# Patient Record
Sex: MALE | Race: WHITE | Employment: OTHER | URBAN - METROPOLITAN AREA
[De-identification: names, ages, dates, MRNs, and addresses within clinical notes are randomized per-mention and may not be internally consistent; named-entity substitution may affect disease eponyms.]

---

## 2017-04-10 RX ORDER — LORATADINE 10 MG/1
10 TABLET ORAL DAILY
COMMUNITY
End: 2019-03-07

## 2017-04-10 RX ORDER — DULOXETIN HYDROCHLORIDE 60 MG/1
60 CAPSULE, DELAYED RELEASE ORAL DAILY
COMMUNITY
End: 2019-03-07

## 2017-04-10 RX ORDER — METFORMIN HYDROCHLORIDE 500 MG/1
1000 TABLET ORAL 2 TIMES DAILY WITH MEALS
COMMUNITY
End: 2019-03-07

## 2017-04-10 RX ORDER — GABAPENTIN 600 MG/1
800 TABLET ORAL 3 TIMES DAILY
COMMUNITY
End: 2022-10-17

## 2017-04-10 RX ORDER — ATORVASTATIN CALCIUM 20 MG/1
20 TABLET, FILM COATED ORAL
COMMUNITY

## 2017-04-10 RX ORDER — FENOFIBRATE 200 MG/1
200 CAPSULE ORAL DAILY
COMMUNITY
End: 2019-03-07

## 2017-04-10 RX ORDER — TESTOSTERONE 10 MG/.5G
4 GEL, METERED TOPICAL DAILY
COMMUNITY
End: 2019-03-07

## 2017-04-10 RX ORDER — ESZOPICLONE 2 MG/1
2 TABLET, FILM COATED ORAL
COMMUNITY
End: 2019-03-07

## 2017-04-10 RX ORDER — TELMISARTAN AND HYDROCHLORTHIAZIDE 80; 12.5 MG/1; MG/1
1 TABLET ORAL DAILY
COMMUNITY
End: 2021-02-12

## 2017-04-10 RX ORDER — VERAPAMIL HYDROCHLORIDE 180 MG/1
180 CAPSULE, EXTENDED RELEASE ORAL DAILY
COMMUNITY
End: 2020-10-13

## 2017-04-10 RX ORDER — CLOPIDOGREL BISULFATE 75 MG/1
75 TABLET ORAL DAILY
COMMUNITY
End: 2017-04-19

## 2017-04-14 ENCOUNTER — ANESTHESIA EVENT (OUTPATIENT)
Dept: SURGERY | Age: 77
DRG: 517 | End: 2017-04-14
Payer: MEDICARE

## 2017-04-17 ENCOUNTER — ANESTHESIA (OUTPATIENT)
Dept: SURGERY | Age: 77
DRG: 517 | End: 2017-04-17
Payer: MEDICARE

## 2017-04-17 ENCOUNTER — HOSPITAL ENCOUNTER (INPATIENT)
Age: 77
LOS: 1 days | Discharge: HOME | DRG: 517 | End: 2017-04-19
Attending: NEUROLOGICAL SURGERY | Admitting: NEUROLOGICAL SURGERY
Payer: MEDICARE

## 2017-04-17 ENCOUNTER — APPOINTMENT (OUTPATIENT)
Dept: GENERAL RADIOLOGY | Age: 77
DRG: 517 | End: 2017-04-17
Attending: NEUROLOGICAL SURGERY
Payer: MEDICARE

## 2017-04-17 PROBLEM — M48.061 LUMBAR STENOSIS: Chronic | Status: ACTIVE | Noted: 2017-04-17

## 2017-04-17 LAB
ABO + RH BLD: NORMAL
BLOOD GROUP ANTIBODIES SERPL: NORMAL
EST. AVERAGE GLUCOSE BLD GHB EST-MCNC: 177 MG/DL
GLUCOSE BLD STRIP.AUTO-MCNC: 156 MG/DL (ref 70–110)
GLUCOSE BLD STRIP.AUTO-MCNC: 166 MG/DL (ref 70–110)
GLUCOSE BLD STRIP.AUTO-MCNC: 168 MG/DL (ref 70–110)
GLUCOSE BLD STRIP.AUTO-MCNC: 250 MG/DL (ref 70–110)
GLUCOSE BLD STRIP.AUTO-MCNC: 328 MG/DL (ref 70–110)
HBA1C MFR BLD: 7.8 % (ref 4.2–5.6)
SPECIMEN EXP DATE BLD: NORMAL

## 2017-04-17 PROCEDURE — G8979 MOBILITY GOAL STATUS: HCPCS

## 2017-04-17 PROCEDURE — 77030032490 HC SLV COMPR SCD KNE COVD -B: Performed by: NEUROLOGICAL SURGERY

## 2017-04-17 PROCEDURE — 77030003028 HC SUT VCRL J&J -A: Performed by: NEUROLOGICAL SURGERY

## 2017-04-17 PROCEDURE — 74011250636 HC RX REV CODE- 250/636: Performed by: NEUROLOGICAL SURGERY

## 2017-04-17 PROCEDURE — 76210000006 HC OR PH I REC 0.5 TO 1 HR: Performed by: NEUROLOGICAL SURGERY

## 2017-04-17 PROCEDURE — 74011250636 HC RX REV CODE- 250/636

## 2017-04-17 PROCEDURE — 86900 BLOOD TYPING SEROLOGIC ABO: CPT | Performed by: NEUROLOGICAL SURGERY

## 2017-04-17 PROCEDURE — 99218 HC RM OBSERVATION: CPT

## 2017-04-17 PROCEDURE — 77030014647 HC SEAL FBRN TISSL BAXT -D: Performed by: NEUROLOGICAL SURGERY

## 2017-04-17 PROCEDURE — 77030034694 HC SCPL CANADY PLSM DISP USMD -E: Performed by: NEUROLOGICAL SURGERY

## 2017-04-17 PROCEDURE — 74011000250 HC RX REV CODE- 250

## 2017-04-17 PROCEDURE — 77030019908 HC STETH ESOPH SIMS -A: Performed by: ANESTHESIOLOGY

## 2017-04-17 PROCEDURE — 97530 THERAPEUTIC ACTIVITIES: CPT

## 2017-04-17 PROCEDURE — 36415 COLL VENOUS BLD VENIPUNCTURE: CPT | Performed by: HOSPITALIST

## 2017-04-17 PROCEDURE — 74011250636 HC RX REV CODE- 250/636: Performed by: NURSE ANESTHETIST, CERTIFIED REGISTERED

## 2017-04-17 PROCEDURE — 77030012602 HC SPNG PTTY NEUR J&J -B: Performed by: NEUROLOGICAL SURGERY

## 2017-04-17 PROCEDURE — 77030003029 HC SUT VCRL J&J -B: Performed by: NEUROLOGICAL SURGERY

## 2017-04-17 PROCEDURE — 74011000258 HC RX REV CODE- 258: Performed by: NEUROLOGICAL SURGERY

## 2017-04-17 PROCEDURE — 83036 HEMOGLOBIN GLYCOSYLATED A1C: CPT | Performed by: HOSPITALIST

## 2017-04-17 PROCEDURE — 74011250637 HC RX REV CODE- 250/637: Performed by: NURSE ANESTHETIST, CERTIFIED REGISTERED

## 2017-04-17 PROCEDURE — 82962 GLUCOSE BLOOD TEST: CPT

## 2017-04-17 PROCEDURE — 77030031139 HC SUT VCRL2 J&J -A: Performed by: NEUROLOGICAL SURGERY

## 2017-04-17 PROCEDURE — 77030005515 HC CATH URETH FOL14 BARD -B: Performed by: NEUROLOGICAL SURGERY

## 2017-04-17 PROCEDURE — 74011000250 HC RX REV CODE- 250: Performed by: NEUROLOGICAL SURGERY

## 2017-04-17 PROCEDURE — 77030018836 HC SOL IRR NACL ICUM -A: Performed by: NEUROLOGICAL SURGERY

## 2017-04-17 PROCEDURE — 74011250637 HC RX REV CODE- 250/637: Performed by: NEUROLOGICAL SURGERY

## 2017-04-17 PROCEDURE — C1729 CATH, DRAINAGE: HCPCS | Performed by: NEUROLOGICAL SURGERY

## 2017-04-17 PROCEDURE — 77010033678 HC OXYGEN DAILY

## 2017-04-17 PROCEDURE — 74011000250 HC RX REV CODE- 250: Performed by: NURSE ANESTHETIST, CERTIFIED REGISTERED

## 2017-04-17 PROCEDURE — 77030027138 HC INCENT SPIROMETER -A

## 2017-04-17 PROCEDURE — 01NB0ZZ RELEASE LUMBAR NERVE, OPEN APPROACH: ICD-10-PCS | Performed by: NEUROLOGICAL SURGERY

## 2017-04-17 PROCEDURE — 74011636637 HC RX REV CODE- 636/637: Performed by: HOSPITALIST

## 2017-04-17 PROCEDURE — BR16ZZZ FLUOROSCOPY OF LUMBAR FACET JOINT(S): ICD-10-PCS | Performed by: NEUROLOGICAL SURGERY

## 2017-04-17 PROCEDURE — 77030013079 HC BLNKT BAIR HGGR 3M -A: Performed by: ANESTHESIOLOGY

## 2017-04-17 PROCEDURE — 51798 US URINE CAPACITY MEASURE: CPT

## 2017-04-17 PROCEDURE — 97162 PT EVAL MOD COMPLEX 30 MIN: CPT

## 2017-04-17 PROCEDURE — 74011636637 HC RX REV CODE- 636/637: Performed by: NURSE ANESTHETIST, CERTIFIED REGISTERED

## 2017-04-17 PROCEDURE — 74011000272 HC RX REV CODE- 272: Performed by: NEUROLOGICAL SURGERY

## 2017-04-17 PROCEDURE — 77030008683 HC TU ET CUF COVD -A: Performed by: ANESTHESIOLOGY

## 2017-04-17 PROCEDURE — G8978 MOBILITY CURRENT STATUS: HCPCS

## 2017-04-17 PROCEDURE — 93005 ELECTROCARDIOGRAM TRACING: CPT

## 2017-04-17 PROCEDURE — 0QB00ZZ EXCISION OF LUMBAR VERTEBRA, OPEN APPROACH: ICD-10-PCS | Performed by: NEUROLOGICAL SURGERY

## 2017-04-17 PROCEDURE — 76060000034 HC ANESTHESIA 1.5 TO 2 HR: Performed by: NEUROLOGICAL SURGERY

## 2017-04-17 PROCEDURE — 77030011943

## 2017-04-17 PROCEDURE — 77030028270 HC SRGFL HEMSTAT MTRX J&J -C: Performed by: NEUROLOGICAL SURGERY

## 2017-04-17 PROCEDURE — 76010000153 HC OR TIME 1.5 TO 2 HR: Performed by: NEUROLOGICAL SURGERY

## 2017-04-17 PROCEDURE — 77030004391 HC BUR FLUT MEDT -C: Performed by: NEUROLOGICAL SURGERY

## 2017-04-17 RX ORDER — MAGNESIUM SULFATE 100 %
4 CRYSTALS MISCELLANEOUS AS NEEDED
Status: DISCONTINUED | OUTPATIENT
Start: 2017-04-17 | End: 2017-04-17 | Stop reason: HOSPADM

## 2017-04-17 RX ORDER — HYDROMORPHONE HYDROCHLORIDE 2 MG/ML
0.5 INJECTION, SOLUTION INTRAMUSCULAR; INTRAVENOUS; SUBCUTANEOUS
Status: DISCONTINUED | OUTPATIENT
Start: 2017-04-17 | End: 2017-04-17 | Stop reason: HOSPADM

## 2017-04-17 RX ORDER — DEXTROSE MONOHYDRATE 25 G/50ML
25-50 INJECTION, SOLUTION INTRAVENOUS AS NEEDED
Status: DISCONTINUED | OUTPATIENT
Start: 2017-04-17 | End: 2017-04-17 | Stop reason: HOSPADM

## 2017-04-17 RX ORDER — INSULIN LISPRO 100 [IU]/ML
INJECTION, SOLUTION INTRAVENOUS; SUBCUTANEOUS ONCE
Status: COMPLETED | OUTPATIENT
Start: 2017-04-17 | End: 2017-04-17

## 2017-04-17 RX ORDER — PROPOFOL 10 MG/ML
INJECTION, EMULSION INTRAVENOUS AS NEEDED
Status: DISCONTINUED | OUTPATIENT
Start: 2017-04-17 | End: 2017-04-17 | Stop reason: HOSPADM

## 2017-04-17 RX ORDER — DIPHENHYDRAMINE HYDROCHLORIDE 50 MG/ML
12.5 INJECTION, SOLUTION INTRAMUSCULAR; INTRAVENOUS
Status: DISCONTINUED | OUTPATIENT
Start: 2017-04-17 | End: 2017-04-19 | Stop reason: HOSPADM

## 2017-04-17 RX ORDER — ZOLPIDEM TARTRATE 5 MG/1
5 TABLET ORAL
Status: DISCONTINUED | OUTPATIENT
Start: 2017-04-17 | End: 2017-04-19 | Stop reason: HOSPADM

## 2017-04-17 RX ORDER — TESTOSTERONE 10 MG/.5G
4 GEL, METERED TOPICAL DAILY
Status: DISCONTINUED | OUTPATIENT
Start: 2017-04-18 | End: 2017-04-19 | Stop reason: HOSPADM

## 2017-04-17 RX ORDER — VERAPAMIL HYDROCHLORIDE 180 MG/1
180 TABLET, EXTENDED RELEASE ORAL
Status: DISCONTINUED | OUTPATIENT
Start: 2017-04-18 | End: 2017-04-19 | Stop reason: HOSPADM

## 2017-04-17 RX ORDER — SODIUM CHLORIDE 0.9 % (FLUSH) 0.9 %
5-10 SYRINGE (ML) INJECTION EVERY 8 HOURS
Status: DISCONTINUED | OUTPATIENT
Start: 2017-04-17 | End: 2017-04-19 | Stop reason: HOSPADM

## 2017-04-17 RX ORDER — MIDAZOLAM HYDROCHLORIDE 1 MG/ML
INJECTION, SOLUTION INTRAMUSCULAR; INTRAVENOUS AS NEEDED
Status: DISCONTINUED | OUTPATIENT
Start: 2017-04-17 | End: 2017-04-17 | Stop reason: HOSPADM

## 2017-04-17 RX ORDER — FENOFIBRATE 145 MG/1
145 TABLET, COATED ORAL DAILY
Status: DISCONTINUED | OUTPATIENT
Start: 2017-04-18 | End: 2017-04-19 | Stop reason: HOSPADM

## 2017-04-17 RX ORDER — LIDOCAINE HYDROCHLORIDE 20 MG/ML
INJECTION, SOLUTION EPIDURAL; INFILTRATION; INTRACAUDAL; PERINEURAL AS NEEDED
Status: DISCONTINUED | OUTPATIENT
Start: 2017-04-17 | End: 2017-04-17 | Stop reason: HOSPADM

## 2017-04-17 RX ORDER — NEOSTIGMINE METHYLSULFATE 5 MG/5 ML
SYRINGE (ML) INTRAVENOUS AS NEEDED
Status: DISCONTINUED | OUTPATIENT
Start: 2017-04-17 | End: 2017-04-17 | Stop reason: HOSPADM

## 2017-04-17 RX ORDER — NALOXONE HYDROCHLORIDE 0.4 MG/ML
0.4 INJECTION, SOLUTION INTRAMUSCULAR; INTRAVENOUS; SUBCUTANEOUS AS NEEDED
Status: DISCONTINUED | OUTPATIENT
Start: 2017-04-17 | End: 2017-04-19 | Stop reason: HOSPADM

## 2017-04-17 RX ORDER — HYDROMORPHONE HYDROCHLORIDE 1 MG/ML
1 INJECTION, SOLUTION INTRAMUSCULAR; INTRAVENOUS; SUBCUTANEOUS
Status: DISCONTINUED | OUTPATIENT
Start: 2017-04-17 | End: 2017-04-19 | Stop reason: HOSPADM

## 2017-04-17 RX ORDER — HYDROMORPHONE HYDROCHLORIDE 1 MG/ML
INJECTION, SOLUTION INTRAMUSCULAR; INTRAVENOUS; SUBCUTANEOUS AS NEEDED
Status: DISCONTINUED | OUTPATIENT
Start: 2017-04-17 | End: 2017-04-17 | Stop reason: HOSPADM

## 2017-04-17 RX ORDER — ACETAMINOPHEN 325 MG/1
650 TABLET ORAL
Status: DISCONTINUED | OUTPATIENT
Start: 2017-04-17 | End: 2017-04-19 | Stop reason: HOSPADM

## 2017-04-17 RX ORDER — BUPIVACAINE HYDROCHLORIDE 2.5 MG/ML
INJECTION, SOLUTION EPIDURAL; INFILTRATION; INTRACAUDAL AS NEEDED
Status: DISCONTINUED | OUTPATIENT
Start: 2017-04-17 | End: 2017-04-17 | Stop reason: HOSPADM

## 2017-04-17 RX ORDER — ROCURONIUM BROMIDE 10 MG/ML
INJECTION, SOLUTION INTRAVENOUS AS NEEDED
Status: DISCONTINUED | OUTPATIENT
Start: 2017-04-17 | End: 2017-04-17 | Stop reason: HOSPADM

## 2017-04-17 RX ORDER — OXYCODONE AND ACETAMINOPHEN 10; 325 MG/1; MG/1
1 TABLET ORAL
Status: DISCONTINUED | OUTPATIENT
Start: 2017-04-17 | End: 2017-04-19 | Stop reason: HOSPADM

## 2017-04-17 RX ORDER — LIDOCAINE HYDROCHLORIDE 10 MG/ML
0.1 INJECTION, SOLUTION EPIDURAL; INFILTRATION; INTRACAUDAL; PERINEURAL AS NEEDED
Status: DISCONTINUED | OUTPATIENT
Start: 2017-04-17 | End: 2017-04-17 | Stop reason: HOSPADM

## 2017-04-17 RX ORDER — GABAPENTIN 400 MG/1
1200 CAPSULE ORAL 3 TIMES DAILY
Status: DISCONTINUED | OUTPATIENT
Start: 2017-04-17 | End: 2017-04-19 | Stop reason: HOSPADM

## 2017-04-17 RX ORDER — GLYCOPYRROLATE 0.2 MG/ML
INJECTION INTRAMUSCULAR; INTRAVENOUS AS NEEDED
Status: DISCONTINUED | OUTPATIENT
Start: 2017-04-17 | End: 2017-04-17 | Stop reason: HOSPADM

## 2017-04-17 RX ORDER — HYDROCHLOROTHIAZIDE 25 MG/1
12.5 TABLET ORAL DAILY
Status: DISCONTINUED | OUTPATIENT
Start: 2017-04-18 | End: 2017-04-19 | Stop reason: HOSPADM

## 2017-04-17 RX ORDER — ONDANSETRON 2 MG/ML
4 INJECTION INTRAMUSCULAR; INTRAVENOUS
Status: DISCONTINUED | OUTPATIENT
Start: 2017-04-17 | End: 2017-04-19 | Stop reason: HOSPADM

## 2017-04-17 RX ORDER — ONDANSETRON 2 MG/ML
INJECTION INTRAMUSCULAR; INTRAVENOUS AS NEEDED
Status: DISCONTINUED | OUTPATIENT
Start: 2017-04-17 | End: 2017-04-17 | Stop reason: HOSPADM

## 2017-04-17 RX ORDER — ADHESIVE BANDAGE
30 BANDAGE TOPICAL DAILY PRN
Status: DISCONTINUED | OUTPATIENT
Start: 2017-04-17 | End: 2017-04-19 | Stop reason: HOSPADM

## 2017-04-17 RX ORDER — VANCOMYCIN HYDROCHLORIDE 1 G/20ML
INJECTION, POWDER, LYOPHILIZED, FOR SOLUTION INTRAVENOUS AS NEEDED
Status: DISCONTINUED | OUTPATIENT
Start: 2017-04-17 | End: 2017-04-17 | Stop reason: HOSPADM

## 2017-04-17 RX ORDER — FAMOTIDINE 20 MG/1
20 TABLET, FILM COATED ORAL ONCE
Status: COMPLETED | OUTPATIENT
Start: 2017-04-17 | End: 2017-04-17

## 2017-04-17 RX ORDER — INSULIN LISPRO 100 [IU]/ML
INJECTION, SOLUTION INTRAVENOUS; SUBCUTANEOUS
Status: DISPENSED
Start: 2017-04-17 | End: 2017-04-18

## 2017-04-17 RX ORDER — CYCLOBENZAPRINE HCL 10 MG
10 TABLET ORAL
Status: DISCONTINUED | OUTPATIENT
Start: 2017-04-17 | End: 2017-04-19 | Stop reason: HOSPADM

## 2017-04-17 RX ORDER — ATORVASTATIN CALCIUM 20 MG/1
20 TABLET, FILM COATED ORAL DAILY
Status: DISCONTINUED | OUTPATIENT
Start: 2017-04-17 | End: 2017-04-18

## 2017-04-17 RX ORDER — CLINDAMYCIN PHOSPHATE 900 MG/50ML
INJECTION INTRAVENOUS AS NEEDED
Status: DISCONTINUED | OUTPATIENT
Start: 2017-04-17 | End: 2017-04-17 | Stop reason: HOSPADM

## 2017-04-17 RX ORDER — FAMOTIDINE 20 MG/1
20 TABLET, FILM COATED ORAL EVERY 12 HOURS
Status: DISCONTINUED | OUTPATIENT
Start: 2017-04-17 | End: 2017-04-18

## 2017-04-17 RX ORDER — FENTANYL CITRATE 50 UG/ML
25 INJECTION, SOLUTION INTRAMUSCULAR; INTRAVENOUS AS NEEDED
Status: DISCONTINUED | OUTPATIENT
Start: 2017-04-17 | End: 2017-04-17 | Stop reason: HOSPADM

## 2017-04-17 RX ORDER — SODIUM CHLORIDE, SODIUM LACTATE, POTASSIUM CHLORIDE, CALCIUM CHLORIDE 600; 310; 30; 20 MG/100ML; MG/100ML; MG/100ML; MG/100ML
100 INJECTION, SOLUTION INTRAVENOUS CONTINUOUS
Status: DISCONTINUED | OUTPATIENT
Start: 2017-04-17 | End: 2017-04-17 | Stop reason: HOSPADM

## 2017-04-17 RX ORDER — TELMISARTAN 40 MG/1
80 TABLET ORAL DAILY
Status: DISCONTINUED | OUTPATIENT
Start: 2017-04-18 | End: 2017-04-19 | Stop reason: HOSPADM

## 2017-04-17 RX ORDER — SODIUM CHLORIDE, SODIUM LACTATE, POTASSIUM CHLORIDE, CALCIUM CHLORIDE 600; 310; 30; 20 MG/100ML; MG/100ML; MG/100ML; MG/100ML
25 INJECTION, SOLUTION INTRAVENOUS CONTINUOUS
Status: DISCONTINUED | OUTPATIENT
Start: 2017-04-17 | End: 2017-04-17 | Stop reason: HOSPADM

## 2017-04-17 RX ORDER — DEXAMETHASONE SODIUM PHOSPHATE 4 MG/ML
INJECTION, SOLUTION INTRA-ARTICULAR; INTRALESIONAL; INTRAMUSCULAR; INTRAVENOUS; SOFT TISSUE AS NEEDED
Status: DISCONTINUED | OUTPATIENT
Start: 2017-04-17 | End: 2017-04-17 | Stop reason: HOSPADM

## 2017-04-17 RX ORDER — LORATADINE 10 MG/1
10 TABLET ORAL DAILY
Status: DISCONTINUED | OUTPATIENT
Start: 2017-04-17 | End: 2017-04-19 | Stop reason: HOSPADM

## 2017-04-17 RX ORDER — FENTANYL CITRATE 50 UG/ML
INJECTION, SOLUTION INTRAMUSCULAR; INTRAVENOUS AS NEEDED
Status: DISCONTINUED | OUTPATIENT
Start: 2017-04-17 | End: 2017-04-17 | Stop reason: HOSPADM

## 2017-04-17 RX ORDER — DEXAMETHASONE SODIUM PHOSPHATE 4 MG/ML
4 INJECTION, SOLUTION INTRA-ARTICULAR; INTRALESIONAL; INTRAMUSCULAR; INTRAVENOUS; SOFT TISSUE EVERY 8 HOURS
Status: COMPLETED | OUTPATIENT
Start: 2017-04-17 | End: 2017-04-18

## 2017-04-17 RX ORDER — SUCCINYLCHOLINE CHLORIDE 20 MG/ML
INJECTION INTRAMUSCULAR; INTRAVENOUS AS NEEDED
Status: DISCONTINUED | OUTPATIENT
Start: 2017-04-17 | End: 2017-04-17 | Stop reason: HOSPADM

## 2017-04-17 RX ORDER — SODIUM CHLORIDE 0.9 % (FLUSH) 0.9 %
5-10 SYRINGE (ML) INJECTION AS NEEDED
Status: DISCONTINUED | OUTPATIENT
Start: 2017-04-17 | End: 2017-04-19 | Stop reason: HOSPADM

## 2017-04-17 RX ORDER — ALBUTEROL SULFATE 0.83 MG/ML
2.5 SOLUTION RESPIRATORY (INHALATION) AS NEEDED
Status: DISCONTINUED | OUTPATIENT
Start: 2017-04-17 | End: 2017-04-17 | Stop reason: HOSPADM

## 2017-04-17 RX ORDER — DULOXETIN HYDROCHLORIDE 30 MG/1
60 CAPSULE, DELAYED RELEASE ORAL DAILY
Status: DISCONTINUED | OUTPATIENT
Start: 2017-04-17 | End: 2017-04-19 | Stop reason: HOSPADM

## 2017-04-17 RX ADMIN — LORATADINE 10 MG: 10 TABLET ORAL at 13:26

## 2017-04-17 RX ADMIN — INSULIN DETEMIR 7 UNITS: 100 INJECTION, SOLUTION SUBCUTANEOUS at 17:21

## 2017-04-17 RX ADMIN — GABAPENTIN 1200 MG: 400 CAPSULE ORAL at 21:50

## 2017-04-17 RX ADMIN — Medication 3 MG: at 08:56

## 2017-04-17 RX ADMIN — DEXAMETHASONE SODIUM PHOSPHATE 4 MG: 4 INJECTION, SOLUTION INTRAMUSCULAR; INTRAVENOUS at 13:26

## 2017-04-17 RX ADMIN — PROPOFOL 150 MG: 10 INJECTION, EMULSION INTRAVENOUS at 07:43

## 2017-04-17 RX ADMIN — DULOXETINE HYDROCHLORIDE 60 MG: 30 CAPSULE, DELAYED RELEASE ORAL at 13:26

## 2017-04-17 RX ADMIN — HYDROMORPHONE HYDROCHLORIDE 1 MG: 1 INJECTION, SOLUTION INTRAMUSCULAR; INTRAVENOUS; SUBCUTANEOUS at 07:35

## 2017-04-17 RX ADMIN — GLYCOPYRROLATE 0.2 MG: 0.2 INJECTION INTRAMUSCULAR; INTRAVENOUS at 07:35

## 2017-04-17 RX ADMIN — OXYCODONE HYDROCHLORIDE AND ACETAMINOPHEN 1 TABLET: 10; 325 TABLET ORAL at 17:19

## 2017-04-17 RX ADMIN — DEXAMETHASONE SODIUM PHOSPHATE 10 MG: 4 INJECTION, SOLUTION INTRA-ARTICULAR; INTRALESIONAL; INTRAMUSCULAR; INTRAVENOUS; SOFT TISSUE at 08:00

## 2017-04-17 RX ADMIN — GLYCOPYRROLATE 0.4 MG: 0.2 INJECTION INTRAMUSCULAR; INTRAVENOUS at 08:56

## 2017-04-17 RX ADMIN — FAMOTIDINE 20 MG: 20 TABLET, FILM COATED ORAL at 20:59

## 2017-04-17 RX ADMIN — CLINDAMYCIN PHOSPHATE 900 MG: 900 INJECTION INTRAVENOUS at 07:55

## 2017-04-17 RX ADMIN — ROCURONIUM BROMIDE 40 MG: 10 INJECTION, SOLUTION INTRAVENOUS at 07:55

## 2017-04-17 RX ADMIN — LIDOCAINE HYDROCHLORIDE 60 MG: 20 INJECTION, SOLUTION EPIDURAL; INFILTRATION; INTRACAUDAL; PERINEURAL at 07:43

## 2017-04-17 RX ADMIN — SUCCINYLCHOLINE CHLORIDE 100 MG: 20 INJECTION INTRAMUSCULAR; INTRAVENOUS at 07:43

## 2017-04-17 RX ADMIN — FAMOTIDINE 20 MG: 20 TABLET ORAL at 07:09

## 2017-04-17 RX ADMIN — DEXAMETHASONE SODIUM PHOSPHATE 4 MG: 4 INJECTION, SOLUTION INTRAMUSCULAR; INTRAVENOUS at 21:50

## 2017-04-17 RX ADMIN — CLINDAMYCIN PHOSPHATE 900 MG: 150 INJECTION, SOLUTION, CONCENTRATE INTRAVENOUS at 12:00

## 2017-04-17 RX ADMIN — CLINDAMYCIN PHOSPHATE 900 MG: 150 INJECTION, SOLUTION, CONCENTRATE INTRAVENOUS at 20:41

## 2017-04-17 RX ADMIN — GABAPENTIN 1200 MG: 400 CAPSULE ORAL at 17:20

## 2017-04-17 RX ADMIN — ZOLPIDEM TARTRATE 5 MG: 5 TABLET, FILM COATED ORAL at 21:54

## 2017-04-17 RX ADMIN — SODIUM CHLORIDE, SODIUM LACTATE, POTASSIUM CHLORIDE, AND CALCIUM CHLORIDE 100 ML/HR: 600; 310; 30; 20 INJECTION, SOLUTION INTRAVENOUS at 10:06

## 2017-04-17 RX ADMIN — ROCURONIUM BROMIDE 10 MG: 10 INJECTION, SOLUTION INTRAVENOUS at 07:43

## 2017-04-17 RX ADMIN — FAMOTIDINE 20 MG: 20 TABLET, FILM COATED ORAL at 13:26

## 2017-04-17 RX ADMIN — INSULIN LISPRO 2 UNITS: 100 INJECTION, SOLUTION INTRAVENOUS; SUBCUTANEOUS at 07:10

## 2017-04-17 RX ADMIN — SODIUM CHLORIDE, SODIUM LACTATE, POTASSIUM CHLORIDE, AND CALCIUM CHLORIDE 25 ML/HR: 600; 310; 30; 20 INJECTION, SOLUTION INTRAVENOUS at 07:09

## 2017-04-17 RX ADMIN — FENTANYL CITRATE 50 MCG: 50 INJECTION, SOLUTION INTRAMUSCULAR; INTRAVENOUS at 07:55

## 2017-04-17 RX ADMIN — MIDAZOLAM HYDROCHLORIDE 2 MG: 1 INJECTION, SOLUTION INTRAMUSCULAR; INTRAVENOUS at 07:35

## 2017-04-17 RX ADMIN — ATORVASTATIN CALCIUM 20 MG: 20 TABLET, FILM COATED ORAL at 13:25

## 2017-04-17 RX ADMIN — INSULIN DETEMIR 7 UNITS: 100 INJECTION, SOLUTION SUBCUTANEOUS at 21:00

## 2017-04-17 RX ADMIN — ONDANSETRON 4 MG: 2 INJECTION INTRAMUSCULAR; INTRAVENOUS at 07:35

## 2017-04-17 RX ADMIN — INSULIN LISPRO 3 UNITS: 100 INJECTION, SOLUTION INTRAVENOUS; SUBCUTANEOUS at 10:05

## 2017-04-17 NOTE — PERIOP NOTES
Patient arrived in PACU via stretcher. Jose Sullivan RN assigned as nurse. Received report from OR nurse and Anesthesia (Procedure, I&O, Pain and Vital Signs). Patient connected to monitor. Post op pain and assessment complete. Will continue to monitor.       2502 Family Updated

## 2017-04-17 NOTE — PROGRESS NOTES
[facility] [place] [address]   Discharge Planning/ Assessment    Reasons for Intervention: Interviewed patient, he agrees to share his discharge information with his wife, see below. He states he has a walker at home and he see Dr Luis Alberto Watkins for his primary care needs. He has Medicare and [facility]. His discharge plan is to return home with home care as indicated.      High Risk Criteria  [x] Yes  []No   Physician Referral  [] Yes  [x]No        Date    Nursing Referral  [] Yes  [x]No        Date    Patient/Family Request  [] Yes  [x]No        Date       Resources:    Medicare  [x] Yes  []No   Medicaid  [] Yes  [x]No   No Resources  [] Yes  [x]No   Private Insurance  [x] Yes  []No    Name/Phone Number    Other  [] Yes  [x]No        (i.e. Workman's Comp)         Prior Services:    Prior Services  [] Yes  [x]No   Home Health  [] Yes  [x]No   [address]  [] Yes  [x]No        Number of [address]  [] Yes  [x]No       Meals on Wheels  [] Yes  [x]No   Office on Aging  [] Yes  [x]No   Transportation Services  [] Yes  [x]No   Nursing Home  [] Yes  [x]No        Nursing Home Name    [facility]  [] Yes  [x]No        [address] Name    Other       Information Source:      Information obtained from  [x] Patient  [] Parent   [] [address]  [] Child  [] Spouse   [] Significant Other/Partner   [] Friend      [] EMS    [] Nursing Home Chart          [x] Other: chart   Chart Review  [x] Yes  []No     Family/Support System:    Patient lives with  [] Alone    [x] Spouse   [] Significant Other  [] Children  [] Caretaker   [] Parent  [] Sibling     [] Other       Other Support System:    Is the patient responsible for care of others  [] Yes  [x]No   Information of person caring for patient on  discharge self   Managers financial affairs independently  [x] Yes  []No   If no, explain:      Status Prior to Admission:    Mental Status  [x] Awake  [x] Alert  [x] Oriented  [] Quiet/Calm [] Lethargic/Sedated   [] Disoriented  [] Restless/Anxious  [] Combative   Personal Care  [] Dependent  [x] Independent Personal Care  [] Requires Assistance   Meal Preparation Ability  [x] Independent   [] Standby Assistance   [] Minimal Assistance   [] Moderate Assistance  [] Maximum Assistance     [] Total Assistance   Chores  [x] Independent with Chores   [] N/A Nursing Home Resident   [] Requires Assistance   Bowel/Bladder  [x] Continent  [] Catheter  [] Incontinent  [] Ostomy Self-Care    [] Urine Diversion Self-Care  [] Maximum Assistance     [] Total Assistance   Number of Persons needed for assistance    DME at home  [] Amrit Gardner  [] Warren Gardner   [] Commode    [] Bathroom/Grab Bars  [] Hospital Bed  [] Nebulizer  [] Oxygen           [] Raised Toilet Seat  [] Shower Chair  [] Side Rails for Bed   [] Tub Transfer Bench   [x] Walker, Rolling  [] Jailene Steel, Standard      [] Other:   Vendor      Treatment Presently Receiving:    Current Treatments  [] Chemotherapy  [] Dialysis  [] Insulin  [] IVAB [x] IVF   [] O2  [] PCA   [] PT   [] RT   [] Tube Feedings   [] Wound Care     Psychosocial Evaluation:    Verbalized Knowledge of Disease Process  [x] Patient  [x]Family   Coping with Disease Process  [x] Patient  [x]Family   Requires Further Counseling Coping with Disease Process  [] Patient  []Family     Identified Projected Needs:    Home Health Aid  [] Yes  [x]No   Transportation  [] Yes  [x]No   Education  [] Yes  [x]No        Specific Education     Financial Counseling  [] Yes  [x]No   Inability to Care for Self/Will Require 24 hour care  [] Yes  [x]No   Pain Management  [] Yes  [x]No   Home Infusion Therapy  [] Yes  [x]No   Oxygen Therapy  [] Yes  [x]No   DME  [] Yes  [x]No   Long Term Care Placement  [] Yes  [x]No   Rehab  [] Yes  [x]No   Physical Therapy  [] Yes  [x]No   Needs Anticipated At This Time  [] Yes  [x]No     Intra-Hospital Referral:    [facility]  [] Yes  [x]No     [] Yes  [x]No   Patient Representative  [] Yes  [x]No   Staff for Teaching Needs  [] Yes  [x]No   Specialty Teaching Needs     Diabetic Educator  [] Yes  [x]No   Referral for Diabetic Educator Needed  [] Yes  [x]No  If Yes, place order for Nutritionist or Diabetic Consult     Tentative Discharge Plan:    Home with No Services  [x] Yes  []No   Home with [address]  [] Yes  [x]No        If Yes, specify type    [address]  [] Yes  [x]No        If Yes, specify type    Meals on Wheels  [] Yes  [x]No   Office of Aging  [] Yes  [x]No   NHP  [] Yes  [x]No   Return to the Nursing Home  [] Yes  [x]No   Rehab Therapy  [] Yes  [x]No   Acute Rehab  [] Yes  [x]No   Subacute Rehab  [] Yes  [x]No   Private Care  [] Yes  [x]No   Substance Abuse Referral  [] Yes  [x]No   Transportation  [] Yes  [x]No   Chore Service  [] Yes  [x]No   Inpatient Hospice  [] Yes  [x]No   OP RT  [] Yes  [x] No   OP Hemo  [] Yes  [x] No   OP PT  [] Yes  [x]No   Support Group  [] Yes  [x]No   Reach to Recovery  [] Yes  [x]No   OP Oncology Clinic  [] Yes  [x]No   Clinic Appointment  [] Yes  [x]No   DME  [] Yes  [x]No   Comments    Name of D/C Planner or  Given to Patient or Family Bailee Garcia RN   Phone Number [phone] [employer] [id number]   Date April 17, 2017   Time 245 pm   If you are discharged home, whom do you designate to participate in your discharge plan and receive any information needed?      Enter name of [address] [facility] [address]  spouse        Phone # of [address] [phone]        Address of [address], [place] [address]        Updated April 17, 2017        Patient refused to designate any           individual

## 2017-04-17 NOTE — IP AVS SNAPSHOT
Summary of Care Report The Summary of Care report has been created to help improve care coordination. Users with access to [employer] or [address] (Web-based application) may access additional patient information including the Discharge Summary. If you are not currently a [address] user and need more information, please call the number listed below in the [place] [address] section and ask to be connected with Medical Records. Facility Information Name Address Phone [address] [facility] [address] [facility] [address] [phone] Patient Information Patient Name Sex  Nancy Cerda ([id number]) Male 1940 Discharge Information Admitting Provider Service Area Unit Inez Younger MD / [place] [address] Ortho Spine [address] [place] / [phone] Discharge Provider Discharge Date/Time Discharge Disposition Destination (none) 2017 (Pending) AHR (none) Patient Language Language ENGLISH [13] Hospital Problems as of 2017  Reviewed: 2017  6:41 AM by Inez Younger MD  
  
  
  
 Class Noted - Resolved Last Modified POA Active Problems * (Principal)Lumbar stenosis (Chronic)  2017 - Present 2017 by Inez Younger MD Yes Entered by Inez Younger MD  
  S/P lumbar laminectomy  2017 - Present 2017 by Inez Younger MD Unknown Entered by Inez Younger MD  
  
Non-Hospital Problems as of 2017  Reviewed: 2017  6:41 AM by Inez Younger MD  
 None You are allergic to the following Allergen Reactions Amoxicillin Hives Current Discharge Medication List  
  
START taking these medications Dose & Instructions Dispensing Information Comments  
 cyclobenzaprine 10 mg tablet Commonly known as:  FLEXERIL  Dose:  10 mg  
 Take 1 Tab by mouth three (3) times daily as needed for Muscle Spasm(s). Quantity:  20 Tab Refills:  0  
   
 oxyCODONE-acetaminophen  mg per tablet Commonly known as:  PERCOCET 10 Dose:  1 Tab Take 1 Tab by mouth every four (4) hours as needed. Max Daily Amount: 6 Tabs. Quantity:  15 Tab Refills:  0 CONTINUE these medications which have NOT CHANGED Dose & Instructions Dispensing Information Comments CLARITIN 10 mg tablet Generic drug:  loratadine Dose:  10 mg Take 10 mg by mouth daily. Refills:  0  
   
 CYMBALTA 60 mg capsule Generic drug:  DULoxetine Dose:  60 mg Take 60 mg by mouth daily. Refills:  0  
   
 fenofibrate micronized 200 mg capsule Commonly known as:  LOFIBRA Dose:  200 mg Take 200 mg by mouth daily. Refills:  0 FORTESTA 10 mg/0.5 gram /actuation Glpm  
Generic drug:  testosterone Dose:  4 Pump(s) 4 Pump(s) by TransDERmal route daily. Refills:  0  
   
 gabapentin 600 mg tablet Commonly known as:  NEURONTIN Dose:  1200 mg Take 1,200 mg by mouth three (3) times daily. Refills:  0 LIPITOR 20 mg tablet Generic drug:  atorvastatin Dose:  20 mg Take 20 mg by mouth daily. Refills:  0 LUNESTA 2 mg tablet Generic drug:  eszopiclone Dose:  2 mg Take 2 mg by mouth nightly. Refills:  0  
   
 metFORMIN 500 mg tablet Commonly known as:  GLUCOPHAGE Dose:  500 mg Take 500 mg by mouth two (2) times daily (with meals). Refills:  0 MICARDIS HCT 80-12.5 mg per tablet Generic drug:  telmisartan-hydroCHLOROthiazide Dose:  1 Tab Take 1 Tab by mouth daily. Refills:  0  
   
 psyllium Powd Commonly known as:  METAMUCIL Take  by mouth daily. Refills:  0  
   
 verapamil  mg CR capsule Commonly known as:  Anju Schlichter Dose:  180 mg Take 180 mg by mouth daily. Refills:  0 STOP taking these medications Comments PLAVIX 75 mg Tab Generic drug:  clopidogrel Current Immunizations Name Date Influenza Vaccine 11/2/2016 Surgery Information ID Date/Time Status Primary Surgeon All Procedures Location [id number] 4/17/2017 [facility] [address], MD lumbar 2-4 laminectomy [facility] MAIN OR Follow-up Information Follow up With Details Comments Contact James Martínez MD   Patient can only remember the practice name and not the physician Discharge Instructions Lumbar Laminectomy for Spinal Stenosis: What to Expect at Home Your Recovery You can expect your back to feel stiff or sore after surgery. This should improve in the weeks after surgery. You may have trouble sitting or standing in one position for very long and may need pain medicine in the weeks after your surgery. Your doctor may advise you to work with a physical therapist to strengthen the muscles around your spine and trunk. You will need to learn how to lift, twist, and bend so that you do not put too much strain on your back. This care sheet gives you a general idea about how long it will take for you to recover. But each person recovers at a different pace. Follow the steps below to get better as quickly as possible. How can you care for yourself at home? Activity · Rest when you feel tired. Getting enough sleep will help you recover. · Try to walk each day. Start by walking a little more than you did the day before. Bit by bit, increase the amount you walk. Walking boosts blood flow and helps prevent pneumonia and constipation. Walking may also decrease your muscle soreness after surgery. · If advised by your doctor, you may need to avoid lifting anything that would cause excessive strain on your back. This may include a child, heavy grocery bags and milk containers, a heavy briefcase or backpack, cat litter or dog food bags, or a vacuum . · Avoid strenuous activities, such as bicycle riding, jogging, weight lifting, or aerobic exercise, until your doctor says it is okay. · Do not drive for 2 to 4 weeks after your surgery or until your doctor says it is okay. · Avoid riding in a car for more than 30 minutes at a time for 2 to 4 weeks after surgery. If you must ride in a car for a longer distance, stop often to walk and stretch your legs. · Try to change your position about every 30 minutes while sitting or standing. This will help decrease your back pain while you are healing. · You will probably need to take 4 to 6 weeks off from work. It depends on the type of work you do and how you feel. · You may have sex as soon as you feel able, but avoid positions that put stress on your back or cause pain. Diet · You can eat your normal diet. If your stomach is upset, try bland, low-fat foods like plain rice, broiled chicken, toast, and yogurt. · Drink plenty of fluids (unless your doctor tells you not to). · You may notice that your bowel movements are not regular right after your surgery. This is common. Try to avoid constipation and straining with bowel movements. You may want to take a fiber supplement every day. If you have not had a bowel movement after a couple of days, ask your doctor about taking a mild laxative. Medicines · Your doctor will tell you if and when you can restart your medicines. He or she will also give you instructions about taking any new medicines. · If you take blood thinners, such as warfarin (Coumadin), clopidogrel (Plavix), or aspirin, be sure to talk to your doctor. He or she will tell you if and when to start taking those medicines again. Make sure that you understand exactly what your doctor wants you to do. · Take pain medicines exactly as directed. ¨ If the doctor gave you a prescription medicine for pain, take it as prescribed.  
¨ If you are not taking a prescription pain medicine, ask your doctor if you can take an over-the-counter medicine. · If your doctor prescribed antibiotics, take them as directed. Do not stop taking them just because you feel better. You need to take the full course of antibiotics. · If you think your pain medicine is making you sick to your stomach: 
¨ Take your medicine after meals (unless your doctor has told you not to). ¨ Ask your doctor for a different pain medicine. Incision care · If you have strips of tape on the cut (incision) the doctor made, leave the tape on for a week or until it falls off. · Wash the area daily with warm, soapy water and pat it dry. · Keep the area clean and dry. You may cover it with a gauze bandage if it weeps or rubs against clothing. Change the bandage every day. Exercise · Do back exercises as instructed by your doctor. · Your doctor may advise you to work with a physical therapist to improve the strength and flexibility of your back. Other instructions · To reduce stiffness and help sore muscles, use a warm water bottle, a heating pad set on low, or a warm cloth on your back. Do not put heat right over the incision. Do not go to sleep with a heating pad on your skin. Follow-up care is a key part of your treatment and safety. Be sure to make and go to all appointments, and call your doctor if you are having problems. It's also a good idea to know your test results and keep a list of the medicines you take. When should you call for help? Call 911 anytime you think you may need emergency care. For example, call if: 
· You passed out (lost consciousness). · You have sudden chest pain and shortness of breath, or you cough up blood. · You are unable to move a leg at all. Call your doctor now or seek immediate medical care if: 
· You have new or worse symptoms in your legs or buttocks. Symptoms may include: ¨ Numbness or tingling. ¨ Weakness. ¨ Pain. · You lose bladder or bowel control. · You have loose stitches, or your incision comes open. · You have blood or fluid draining from the incision. · You have signs of infection, such as: 
¨ Increased pain, swelling, warmth, or redness. ¨ Pus draining from the incision. ¨ A fever. ¨ Red streaks leading from the incision. Watch closely for changes in your health, and be sure to contact your doctor if: 
· You do not have a bowel movement after taking a laxative. · You are not getting better as expected. Where can you learn more? Go to http://trini-elvia.info/. Enter F197 in the search box to learn more about \"Lumbar Laminectomy for Spinal Stenosis: What to Expect at Home. \" Current as of: June 30, 2016 Content Version: 11.2 © 1460-0159 [employer]. Care instructions adapted under license by [employer] (which disclaims liability or warranty for this information). If you have questions about a medical condition or this instruction, always ask your healthcare professional. [place] [address] any warranty or liability for your use of this information. Patient armband removed and shredded MyChart Activation Thank you for requesting access to [employer]. Please follow the instructions below to securely access and download your online medical record. TRIXandTR[employer] allows you to send messages to your doctor, view your test results, renew your prescriptions, schedule appointments, and more. How Do I Sign Up? 1. In your internet browser, go to [email or web address] 
2. Click on the First Time User? Click Here link in the Sign In box. You will be redirect to the New Member Sign Up page. 3. Enter your TRIXandTRAX Access Code exactly as it appears below. You will not need to use this code after youve completed the sign-up process. If you do not sign up before the expiration date, you must request a new code.  
 
TRIXandTRAX Access Code: [email or web address] 
 Expires: 2017 11:25 AM (This is the date your VideoGenie access code will ) 4. Enter the last four digits of your Social Security Number (xxxx) and Date of Birth (mm/dd/yyyy) as indicated and click Submit. You will be taken to the next sign-up page. 5. Create a VideoGenie ID. This will be your VideoGenie login ID and cannot be changed, so think of one that is secure and easy to remember. 6. Create a VideoGenie password. You can change your password at any time. 7. Enter your Password Reset Question and Answer. This can be used at a later time if you forget your password. 8. Enter your e-mail address. You will receive e-mail notification when new information is available in [address] 9. Click Sign Up. You can now view and download portions of your medical record. 10. Click the Download Summary menu link to download a portable copy of your medical information. Additional Information If you have questions, please visit the Frequently Asked Questions section of the VideoGenie website at [email or web address] Remember, VideoGenie is NOT to be used for urgent needs. For medical emergencies, dial 911. DISCHARGE SUMMARY from Nurse The following personal items are in your possession at time of discharge: 
 
Dental Appliances: None Visual Aid: Glasses, With patient Hearing Aids/Status: With patient Home Medications: None Jewelry: None Clothing: None Other Valuables: None Personal Items Sent to Safe: none PATIENT INSTRUCTIONS: 
 
 
F-face looks uneven A-arms unable to move or move unevenly S-speech slurred or non-existent T-time-call 911 as soon as signs and symptoms begin-DO NOT go Back to bed or wait to see if you get better-TIME IS BRAIN. Warning Signs of HEART ATTACK Call 911 if you have these symptoms: 
? Chest discomfort. Most heart attacks involve discomfort in the center of the chest that lasts more than a few minutes, or that goes away and comes back. It can feel like uncomfortable pressure, squeezing, fullness, or pain. ? Discomfort in other areas of the upper body. Symptoms can include pain or discomfort in one or both arms, the back, neck, jaw, or stomach. ? Shortness of breath with or without chest discomfort. ? Other signs may include breaking out in a cold sweat, nausea, or lightheadedness. Don't wait more than five minutes to call [address]! Fast action can save your life. Calling 911 is almost always the fastest way to get lifesaving treatment. Emergency Medical Services staff can begin treatment when they arrive  up to an hour sooner than if someone gets to the hospital by car. The discharge information has been reviewed with the patient and spouse. The patient and spouse verbalized understanding. Discharge medications reviewed with the patient and spouse and appropriate educational materials and side effects teaching were provided. Chart Review Routing History No Routing History on File

## 2017-04-17 NOTE — BRIEF OP NOTE
BRIEF OPERATIVE NOTE    Date of Procedure: 4/17/2017   Preoperative Diagnosis: lumbar stenosis   m48.06  Postoperative Diagnosis: lumbar stenosis   m48.06    Procedure(s):  lumbar 2-4 laminectomy  Surgeon(s) and Role:     * Sonny Navarro MD - Primary            Surgical Staff:  Circ-1: Kathy Valerio RN  Radiology Technician: Kaylyn Ulloa  Scrselma Tech-1: Mars Mccall  Surg Asst-1: Demario Evans  Event Time In   Incision Start 0759   Incision Close      Anesthesia: General   Estimated Blood Loss: minimal  Specimens: * No specimens in log *   Findings: marked lateral recess stenosis   Complications: none  Implants: * No implants in log *

## 2017-04-17 NOTE — PROGRESS NOTES
1120  Received from RR, alert ad oriented, moving all extremities no tingling and no numbness, no skin breakdown, double check with Allegra, no pain at this time, dressing in the back intact, abdomen distended, he said he is always distended. 1300  After hook up to  pulse ox, I was called by tele pt has bundle brunch, he is asymptomatic. Monitor. 1440  I was called from tele, about the BB, called Dr Chris Pritchett, order for ekg. 1530  Result of EKG given to Dr Chris Pritchett no new order, fax result to tele, no chest pain ,monitor, bladder scan 756 cc, abdomen distended, to straight cath pt, pt was walking with P.T assisted 2x in the bathroom no void. 1535  Straight cath done, with out resistance, obtained 800 cc.    1800  [place] with in reach and , pain under control at this time.

## 2017-04-17 NOTE — CONSULTS
Consult Note    Patient: Efren Montiel               Sex: male          DOA: 2017         YOB: 1940      Age:  78 y.o.        LOS:  LOS: 0 days              HPI:     Efren Montiel is a 78 y.o. male who has been admitted to [facility] because of his spinal stenosis . The pt is s/p  Lumbar  2-4 laminectomy by dr Winter Chowdhury. The pt has a  h/o   Diabetes mellitus type 2 and has a h/o htn . He smoked 2-3 packs of cigarettes per day for  48 years before he stopped . He also has a h/o alcohol dependence and stopped drinking alcohol 41 years ago . He joined [place] [address]. The pt has been having intermittent low back pain for a number of years and and the symptome have become severe and he then went to see neurosurgery . Post op the pt says that he is not having any pain . He is eating his lunch . The pt also has a h/ sleep apnea and uses a c pap at night. His wife will bring in the cpap to use in the hospital . Labs have not yet been done and he will be on a sliding scale for his blood glucose   He is on decadron  post op . Pt has had a cva in the past. Details are not clear he has had a rotator cuff repair . Past Medical History:   Diagnosis Date    Diabetes ([facility] [place] [address]     Hypertension     Skin cancer of face     Sleep apnea     Uses CPAP    Stroke [facility])        Past Surgical History:   Procedure Laterality Date    HX CATARACT REMOVAL      HX ROTATOR CUFF REPAIR         History pt had a daughter who  of unknown causes . pt had 6 brothers and one sister .  One brother has       Social History     Social History    Marital status:      Spouse name: N/A    Number of children: N/A    Years of education: N/A     Social History Main Topics    Smoking status: Former Smoker     Quit date: 4/10/2003    Smokeless tobacco: Never Used    Alcohol use No    Drug use: No    Sexual activity: Not Asked     Other Topics Concern    None     Social History Narrative       Prior to Admission medications    Medication Sig Start Date End Date Taking? Authorizing Provider   fenofibrate micronized (LOFIBRA) 200 mg capsule Take 200 mg by mouth daily. Yes Historical Provider   testosterone (FORTESTA) 10 mg/0.5 gram /actuation glpm 4 Pump(s) by TransDERmal route daily. Yes Historical Provider   eszopiclone (LUNESTA) 2 mg tablet Take 2 mg by mouth nightly. Yes Historical Provider   clopidogrel (PLAVIX) 75 mg tab Take 75 mg by mouth daily. Yes Historical Provider   atorvastatin (LIPITOR) 20 mg tablet Take 20 mg by mouth daily. Yes Historical Provider   telmisartan-hydroCHLOROthiazide (MICARDIS HCT) 80-12.5 mg per tablet Take 1 Tab by mouth daily. Yes Historical Provider   metFORMIN (GLUCOPHAGE) 500 mg tablet Take 500 mg by mouth two (2) times daily (with meals). Yes Historical Provider   loratadine (CLARITIN) 10 mg tablet Take 10 mg by mouth daily. Yes Historical Provider   verapamil ER (VERELAN) 180 mg CR capsule Take 180 mg by mouth daily. Yes Historical Provider   gabapentin (NEURONTIN) 600 mg tablet Take 1,200 mg by mouth three (3) times daily. Yes Historical Provider   DULoxetine (CYMBALTA) 60 mg capsule Take 60 mg by mouth daily. Yes Historical Provider   psyllium (METAMUCIL) powd Take  by mouth daily. Yes Historical Provider       Allergies   Allergen Reactions    Amoxicillin Hives       Review of Systems  Pt is awake and alert . denies chest pain . Has no abdominal pain . has mild post op lumbar pain . Moves all extremities . has diabetic neuropathy       Physical Exam:      Visit Vitals    /76 (BP 1 Location: Left arm, BP Patient Position: At rest)    Pulse 68    Temp 97.8 °F (36.6 °C)    Resp 19    Ht 5' 6\" (1.676 m)    Wt 101.2 kg (223 lb)    SpO2 97%    BMI 35.99 kg/m2       Physical Exam:  Pt is awake and is alert   heent  eoms intact perrla . mouth  Tongue is midline .  Neck is supple but is obese   Heart reg rate and rhythm   Lungs good breath sounds heard with intermittent wheezing   Abdomen soft  Obese . nontender   Extremities  Moves all extremities   Neuro cn2-12 intact . Has sleep apnea . Has peripheral neuropathy in lower extremities     Labs Reviewed:  CMP: No results found for: NA, K, CL, CO2, AGAP, GLU, BUN, CREA, GFRAA, GFRNA, CA, MG, PHOS, ALB, TBIL, TP, ALB, GLOB, AGRAT, SGOT, ALT, GPT  CBC: No results found for: WBC, HGB, HGBEXT, HCT, HCTEXT, PLT, PLTEXT, HGBEXT, HCTEXT, PLTEXT    Assessment/Plan     Principal Problem:    Lumbar stenosis (4/17/2017) pt is s/p lumbar 2 - 4 laminectomy   Diabetes mellitus type 2   Peripheral neuropathy   Sleep apnea    htn   Dyslipidemia   H/o cigarette smoking  2-3 packs per day for 48 years   Past h/o alcohol dependence   S/p rotator cuff surgery       Plan  Will put the pt on a sliding scale .  He appears to be very comfortable at the present time

## 2017-04-17 NOTE — IP AVS SNAPSHOT
Zeenat Parker 
 
 
 [address] Patient: Chasity Trevizo MRN: [id number] WRO:[birth year] You are allergic to the following Allergen Reactions Amoxicillin Hives Recent Documentation Height Weight BMI Smoking Status 1.676 m 101.2 kg 35.99 kg/m2 Former Smoker Emergency Contacts Name Discharge Info Relation Home Work Mobile [address] [facility] CAREGIVER [3] Spouse [3] [phone] About your hospitalization You were admitted on:  April 17, 2017 You last received care in the:  [address] [place] [address],[address] You were discharged on:  April 19, 2017 Unit phone number:  [phone] Why you were hospitalized Your primary diagnosis was:  Lumbar Stenosis Your diagnoses also included:  S/P Lumbar Laminectomy Providers Seen During Your Hospitalizations Provider Role Specialty Primary office phone Riley Kang MD Attending Provider Neurosurgery [phone] Gina Dinh MD Attending Provider Internal Medicine [phone] Your Primary Care Physician (PCP) Primary Care Physician Office Phone Office Fax OTHER, PHYS ** None ** ** None ** Follow-up Information Follow up With Details Comments Contact Info Saira Martínez MD   Patient can only remember the practice name and not the physician Current Discharge Medication List  
  
START taking these medications Dose & Instructions Dispensing Information Comments Morning Noon Evening Bedtime  
 cyclobenzaprine 10 mg tablet Commonly known as:  FLEXERIL Your last dose was: Your next dose is:    
   
   
 Dose:  10 mg Take 1 Tab by mouth three (3) times daily as needed for Muscle Spasm(s). Quantity:  20 Tab Refills:  0  
     
   
   
   
  
 oxyCODONE-acetaminophen  mg per tablet Commonly known as:  PERCOCET 10  
   
 Your last dose was: Your next dose is:    
   
   
 Dose:  1 Tab Take 1 Tab by mouth every four (4) hours as needed. Max Daily Amount: 6 Tabs. Quantity:  15 Tab Refills:  0 CONTINUE these medications which have NOT CHANGED Dose & Instructions Dispensing Information Comments Morning Noon Evening Bedtime CLARITIN 10 mg tablet Generic drug:  loratadine Your last dose was: Your next dose is:    
   
   
 Dose:  10 mg Take 10 mg by mouth daily. Refills:  0  
     
   
   
   
  
 CYMBALTA 60 mg capsule Generic drug:  DULoxetine Your last dose was: Your next dose is:    
   
   
 Dose:  60 mg Take 60 mg by mouth daily. Refills:  0  
     
   
   
   
  
 fenofibrate micronized 200 mg capsule Commonly known as:  LOFIBRA Your last dose was: Your next dose is:    
   
   
 Dose:  200 mg Take 200 mg by mouth daily. Refills:  0 FORTESTA 10 mg/0.5 gram /actuation Glpm  
Generic drug:  testosterone Your last dose was: Your next dose is:    
   
   
 Dose:  4 Pump(s) 4 Pump(s) by TransDERmal route daily. Refills:  0  
     
   
   
   
  
 gabapentin 600 mg tablet Commonly known as:  NEURONTIN Your last dose was: Your next dose is:    
   
   
 Dose:  1200 mg Take 1,200 mg by mouth three (3) times daily. Refills:  0 LIPITOR 20 mg tablet Generic drug:  atorvastatin Your last dose was: Your next dose is:    
   
   
 Dose:  20 mg Take 20 mg by mouth daily. Refills:  0 LUNESTA 2 mg tablet Generic drug:  eszopiclone Your last dose was: Your next dose is:    
   
   
 Dose:  2 mg Take 2 mg by mouth nightly. Refills:  0  
     
   
   
   
  
 metFORMIN 500 mg tablet Commonly known as:  GLUCOPHAGE Your last dose was: Your next dose is: Dose:  500 mg Take 500 mg by mouth two (2) times daily (with meals). Refills:  0 MICARDIS HCT 80-12.5 mg per tablet Generic drug:  telmisartan-hydroCHLOROthiazide Your last dose was: Your next dose is:    
   
   
 Dose:  1 Tab Take 1 Tab by mouth daily. Refills:  0  
     
   
   
   
  
 psyllium Powd Commonly known as:  METAMUCIL Your last dose was: Your next dose is: Take  by mouth daily. Refills:  0  
     
   
   
   
  
 verapamil  mg CR capsule Commonly known as:  Bynum Osgood Your last dose was: Your next dose is:    
   
   
 Dose:  180 mg Take 180 mg by mouth daily. Refills:  0 STOP taking these medications PLAVIX 75 mg Tab Generic drug:  clopidogrel Where to Get Your Medications Information on where to get these meds will be given to you by the nurse or doctor. ! Ask your nurse or doctor about these medications  
  cyclobenzaprine 10 mg tablet  
 oxyCODONE-acetaminophen  mg per tablet Discharge Instructions Lumbar Laminectomy for Spinal Stenosis: What to Expect at Home Your Recovery You can expect your back to feel stiff or sore after surgery. This should improve in the weeks after surgery. You may have trouble sitting or standing in one position for very long and may need pain medicine in the weeks after your surgery. Your doctor may advise you to work with a physical therapist to strengthen the muscles around your spine and trunk. You will need to learn how to lift, twist, and bend so that you do not put too much strain on your back. This care sheet gives you a general idea about how long it will take for you to recover. But each person recovers at a different pace. Follow the steps below to get better as quickly as possible. How can you care for yourself at home? Activity · Rest when you feel tired. Getting enough sleep will help you recover. · Try to walk each day. Start by walking a little more than you did the day before. Bit by bit, increase the amount you walk. Walking boosts blood flow and helps prevent pneumonia and constipation. Walking may also decrease your muscle soreness after surgery. · If advised by your doctor, you may need to avoid lifting anything that would cause excessive strain on your back. This may include a child, heavy grocery bags and milk containers, a heavy briefcase or backpack, cat litter or dog food bags, or a vacuum . · Avoid strenuous activities, such as bicycle riding, jogging, weight lifting, or aerobic exercise, until your doctor says it is okay. · Do not drive for 2 to 4 weeks after your surgery or until your doctor says it is okay. · Avoid riding in a car for more than 30 minutes at a time for 2 to 4 weeks after surgery. If you must ride in a car for a longer distance, stop often to walk and stretch your legs. · Try to change your position about every 30 minutes while sitting or standing. This will help decrease your back pain while you are healing. · You will probably need to take 4 to 6 weeks off from work. It depends on the type of work you do and how you feel. · You may have sex as soon as you feel able, but avoid positions that put stress on your back or cause pain. Diet · You can eat your normal diet. If your stomach is upset, try bland, low-fat foods like plain rice, broiled chicken, toast, and yogurt. · Drink plenty of fluids (unless your doctor tells you not to). · You may notice that your bowel movements are not regular right after your surgery. This is common. Try to avoid constipation and straining with bowel movements. You may want to take a fiber supplement every day. If you have not had a bowel movement after a couple of days, ask your doctor about taking a mild laxative. Medicines · Your doctor will tell you if and when you can restart your medicines. He or she will also give you instructions about taking any new medicines. · If you take blood thinners, such as warfarin (Coumadin), clopidogrel (Plavix), or aspirin, be sure to talk to your doctor. He or she will tell you if and when to start taking those medicines again. Make sure that you understand exactly what your doctor wants you to do. · Take pain medicines exactly as directed. ¨ If the doctor gave you a prescription medicine for pain, take it as prescribed. ¨ If you are not taking a prescription pain medicine, ask your doctor if you can take an over-the-counter medicine. · If your doctor prescribed antibiotics, take them as directed. Do not stop taking them just because you feel better. You need to take the full course of antibiotics. · If you think your pain medicine is making you sick to your stomach: 
¨ Take your medicine after meals (unless your doctor has told you not to). ¨ Ask your doctor for a different pain medicine. Incision care · If you have strips of tape on the cut (incision) the doctor made, leave the tape on for a week or until it falls off. · Wash the area daily with warm, soapy water and pat it dry. · Keep the area clean and dry. You may cover it with a gauze bandage if it weeps or rubs against clothing. Change the bandage every day. Exercise · Do back exercises as instructed by your doctor. · Your doctor may advise you to work with a physical therapist to improve the strength and flexibility of your back. Other instructions · To reduce stiffness and help sore muscles, use a warm water bottle, a heating pad set on low, or a warm cloth on your back. Do not put heat right over the incision. Do not go to sleep with a heating pad on your skin. Follow-up care is a key part of your treatment and safety.  Be sure to make and go to all appointments, and call your doctor if you are having problems. It's also a good idea to know your test results and keep a list of the medicines you take. When should you call for help? Call 911 anytime you think you may need emergency care. For example, call if: 
· You passed out (lost consciousness). · You have sudden chest pain and shortness of breath, or you cough up blood. · You are unable to move a leg at all. Call your doctor now or seek immediate medical care if: 
· You have new or worse symptoms in your legs or buttocks. Symptoms may include: ¨ Numbness or tingling. ¨ Weakness. ¨ Pain. · You lose bladder or bowel control. · You have loose stitches, or your incision comes open. · You have blood or fluid draining from the incision. · You have signs of infection, such as: 
¨ Increased pain, swelling, warmth, or redness. ¨ Pus draining from the incision. ¨ A fever. ¨ Red streaks leading from the incision. Watch closely for changes in your health, and be sure to contact your doctor if: 
· You do not have a bowel movement after taking a laxative. · You are not getting better as expected. Where can you learn more? Go to http://trini-elvia.info/. Enter T857 in the search box to learn more about \"Lumbar Laminectomy for Spinal Stenosis: What to Expect at Home. \" Current as of: June 30, 2016 Content Version: 11.2 © 2649-2598 [employer]. Care instructions adapted under license by [employer] (which disclaims liability or warranty for this information). If you have questions about a medical condition or this instruction, always ask your healthcare professional. [place] [address] any warranty or liability for your use of this information. Patient armband removed and shredded [employer] Enterprise[employer] Activation Thank you for requesting access to [employer]. Please follow the instructions below to securely access and download your online medical record.  [employer] allows you to send messages to your doctor, view your test results, renew your prescriptions, schedule appointments, and more. How Do I Sign Up? 1. In your internet browser, go to [email or web address] 
2. Click on the First Time User? Click Here link in the Sign In box. You will be redirect to the New Member Sign Up page. 3. Enter your Thoora Access Code exactly as it appears below. You will not need to use this code after youve completed the sign-up process. If you do not sign up before the expiration date, you must request a new code. Thoora Access Code: [email or web address] Expires: 2017 11:25 AM (This is the date your Thoora access code will ) 4. Enter the last four digits of your Social Security Number (xxxx) and Date of Birth (mm/dd/yyyy) as indicated and click Submit. You will be taken to the next sign-up page. 5. Create a Thoora ID. This will be your Thoora login ID and cannot be changed, so think of one that is secure and easy to remember. 6. Create a Thoora password. You can change your password at any time. 7. Enter your Password Reset Question and Answer. This can be used at a later time if you forget your password. 8. Enter your e-mail address. You will receive e-mail notification when new information is available in [address] 9. Click Sign Up. You can now view and download portions of your medical record. 10. Click the Download Summary menu link to download a portable copy of your medical information. Additional Information If you have questions, please visit the Frequently Asked Questions section of the Thoora website at [email or web address] Remember, Thoora is NOT to be used for urgent needs. For medical emergencies, dial 911. DISCHARGE SUMMARY from Nurse The following personal items are in your possession at time of discharge: 
 
Dental Appliances: None Visual Aid: Glasses, With patient Hearing Aids/Status: With patient Home Medications: None Jewelry: None Clothing: None Other Valuables: None Personal Items Sent to Safe: none PATIENT INSTRUCTIONS: 
 
 
F-face looks uneven A-arms unable to move or move unevenly S-speech slurred or non-existent T-time-call 911 as soon as signs and symptoms begin-DO NOT go Back to bed or wait to see if you get better-TIME IS BRAIN. Warning Signs of HEART ATTACK Call 911 if you have these symptoms: 
? Chest discomfort. Most heart attacks involve discomfort in the center of the chest that lasts more than a few minutes, or that goes away and comes back. It can feel like uncomfortable pressure, squeezing, fullness, or pain. ? Discomfort in other areas of the upper body. Symptoms can include pain or discomfort in one or both arms, the back, neck, jaw, or stomach. ? Shortness of breath with or without chest discomfort. ? Other signs may include breaking out in a cold sweat, nausea, or lightheadedness. Don't wait more than five minutes to call [address]! Fast action can save your life. Calling 911 is almost always the fastest way to get lifesaving treatment. Emergency Medical Services staff can begin treatment when they arrive  up to an hour sooner than if someone gets to the hospital by car. The discharge information has been reviewed with the patient and spouse. The patient and spouse verbalized understanding. Discharge medications reviewed with the patient and spouse and appropriate educational materials and side effects teaching were provided. Discharge Orders None [facility] Announcement We are excited to announce that we are making your provider's discharge notes available to you in PayrollHero. You will see these notes when they are completed and signed by the physician that discharged you from your recent hospital stay. If you have any questions or concerns about any information you see in PayrollHero, please call the Health Information Department where you were seen or reach out to your Primary Care Provider for more information about your plan of care. Introducing [facility]! Denilson Corey introduces PayrollHero patient portal. Now you can access parts of your medical record, email your doctor's office, and request medication refills online. 1. In your internet browser, go to [email or web address] 2. Click on the First Time User? Click Here link in the Sign In box. You will see the New Member Sign Up page. 3. Enter your PayrollHero Access Code exactly as it appears below. You will not need to use this code after youve completed the sign-up process. If you do not sign up before the expiration date, you must request a new code. · PayrollHero Access Code: [email or web address] Expires: 7/9/2017 11:25 AM 
 
4. Enter the last four digits of your Social Security Number (xxxx) and Date of Birth (mm/dd/yyyy) as indicated and click Submit. You will be taken to the next sign-up page. 5. Create a PayrollHero ID. This will be your PayrollHero login ID and cannot be changed, so think of one that is secure and easy to remember. 6. Create a PayrollHero password. You can change your password at any time. 7. Enter your Password Reset Question and Answer. This can be used at a later time if you forget your password. 8. Enter your e-mail address. You will receive e-mail notification when new information is available in [address] 9. Click Sign Up. You can now view and download portions of your medical record.  
10. Click the Download Summary menu link to download a portable copy of your medical information. If you have questions, please visit the Frequently Asked Questions section of the [employer] website. Remember, MyChart is NOT to be used for urgent needs. For medical emergencies, dial 911. Now available from your iPhone and Android! General Information Please provide this summary of care documentation to your next provider. Patient Signature:  ____________________________________________________________ Date:  ____________________________________________________________  
  
Arna Star Provider Signature:  ____________________________________________________________ Date:  ____________________________________________________________

## 2017-04-17 NOTE — IP AVS SNAPSHOT
Current Discharge Medication List  
  
START taking these medications Dose & Instructions Dispensing Information Comments Morning Noon Evening Bedtime  
 cyclobenzaprine 10 mg tablet Commonly known as:  FLEXERIL Your last dose was: Your next dose is:    
   
   
 Dose:  10 mg Take 1 Tab by mouth three (3) times daily as needed for Muscle Spasm(s). Quantity:  20 Tab Refills:  0  
     
   
   
   
  
 oxyCODONE-acetaminophen  mg per tablet Commonly known as:  PERCOCET 10 Your last dose was: Your next dose is:    
   
   
 Dose:  1 Tab Take 1 Tab by mouth every four (4) hours as needed. Max Daily Amount: 6 Tabs. Quantity:  15 Tab Refills:  0 CONTINUE these medications which have NOT CHANGED Dose & Instructions Dispensing Information Comments Morning Noon Evening Bedtime CLARITIN 10 mg tablet Generic drug:  loratadine Your last dose was: Your next dose is:    
   
   
 Dose:  10 mg Take 10 mg by mouth daily. Refills:  0  
     
   
   
   
  
 CYMBALTA 60 mg capsule Generic drug:  DULoxetine Your last dose was: Your next dose is:    
   
   
 Dose:  60 mg Take 60 mg by mouth daily. Refills:  0  
     
   
   
   
  
 fenofibrate micronized 200 mg capsule Commonly known as:  LOFIBRA Your last dose was: Your next dose is:    
   
   
 Dose:  200 mg Take 200 mg by mouth daily. Refills:  0 FORTESTA 10 mg/0.5 gram /actuation Glpm  
Generic drug:  testosterone Your last dose was: Your next dose is:    
   
   
 Dose:  4 Pump(s) 4 Pump(s) by TransDERmal route daily. Refills:  0  
     
   
   
   
  
 gabapentin 600 mg tablet Commonly known as:  NEURONTIN Your last dose was: Your next dose is:    
   
   
 Dose:  1200 mg Take 1,200 mg by mouth three (3) times daily. Refills:  0 LIPITOR 20 mg tablet Generic drug:  atorvastatin Your last dose was: Your next dose is:    
   
   
 Dose:  20 mg Take 20 mg by mouth daily. Refills:  0 LUNESTA 2 mg tablet Generic drug:  eszopiclone Your last dose was: Your next dose is:    
   
   
 Dose:  2 mg Take 2 mg by mouth nightly. Refills:  0  
     
   
   
   
  
 metFORMIN 500 mg tablet Commonly known as:  GLUCOPHAGE Your last dose was: Your next dose is:    
   
   
 Dose:  500 mg Take 500 mg by mouth two (2) times daily (with meals). Refills:  0 MICARDIS HCT 80-12.5 mg per tablet Generic drug:  telmisartan-hydroCHLOROthiazide Your last dose was: Your next dose is:    
   
   
 Dose:  1 Tab Take 1 Tab by mouth daily. Refills:  0  
     
   
   
   
  
 psyllium Powd Commonly known as:  METAMUCIL Your last dose was: Your next dose is: Take  by mouth daily. Refills:  0  
     
   
   
   
  
 verapamil  mg CR capsule Commonly known as:  Wale Neighbor Your last dose was: Your next dose is:    
   
   
 Dose:  180 mg Take 180 mg by mouth daily. Refills:  0 STOP taking these medications PLAVIX 75 mg Tab Generic drug:  clopidogrel Where to Get Your Medications Information on where to get these meds will be given to you by the nurse or doctor. ! Ask your nurse or doctor about these medications  
  cyclobenzaprine 10 mg tablet  
 oxyCODONE-acetaminophen  mg per tablet

## 2017-04-17 NOTE — PROGRESS NOTES
conducted a pre-surgery visit with Chasity Trevizo, who is a 78 y.o.,male. The  provided the following Interventions:  Initiated a relationship of care and support. Offered prayer and assurance of continued prayers on patient's behalf. Plan:  Chaplains will continue to follow and will provide pastoral care on an as needed/requested basis.  recommends bedside caregivers page  on duty if patient shows signs of acute spiritual or emotional distress.     Lashell Kitchen   Spiritual Care   [phone]

## 2017-04-17 NOTE — PROGRESS NOTES
1440: Attempted PM rounds. Pt working with PT.    [phone]: PM rounds complete. Pt resting in bed. Educated on pain control. Pt denies pain at this time. Encouraged to ask for meds if needed. Pt educated on use of IS every hour. Verbalized understanding. Pt educated on importance of mobility. States he walked in britt with PT already. Denies needs at this time. Call bell within reach.

## 2017-04-17 NOTE — PROGRESS NOTES
Problem: Mobility Impaired (Adult and Pediatric)  Goal: *Acute Goals and Plan of Care (Insert Text)  Physical Therapy Goals  Initiated 4/17/2017 and to be accomplished within 7 day(s) following back precautions   1. Patient will move from supine to sit and sit to supine , scoot up and down and roll side to side in bed with modified independence. 2. Patient will transfer from bed to chair and chair to bed with modified independence using the least restrictive device. 3. Patient will perform sit to stand with modified independence. 4. Patient will ambulate with modified independence for 300 feet with the least restrictive device. 5. Patient will ascend/descend 4 stairs with handrail(s) with supervision/set-up  6. Patient will ascend and descend ramp to egress home independently. .  Outcome: Progressing Towards Goal  PHYSICAL THERAPY EVALUATION     Patient: [facility] (25 y.o. male)  Date: 4/17/2017  Primary Diagnosis: lumbar stenosis   m48.06  Lumbar stenosis  Procedure(s) (LRB):  lumbar 2-4 laminectomy (N/A) Day of Surgery   Precautions:   Fall, Back      PROBLEM LIST:  Patient presents with the following problems:   Bed Mobility, Transfers, Gait, Balance, Stairs and Precautions  ASSESSMENT :   Patient requires between supervision/set-up and minimal assistance/contact guard assist for bed mobility, transfers and ambulation. Needing cues for log rolling and proper use of rolling walker. Patient  Had oxygen on  Pre ambulation after ambulation on room air SAT's were 90% returned to oxygen. Attempted twice to urinate without success. Education on plan of care and written precautions of back needs reinforcement. Patient will benefit from skilled intervention to address the above impairments.   Patients rehabilitation potential is considered to be Fair  Factors which may influence rehabilitation potential include:   [ ]         None noted  [ ]         Mental ability/status  [X]         Medical condition  [ ]         Home/family situation and support systems  [ ]         Safety awareness  [ ]         Pain tolerance/management  [ ]         Other:        PLAN :  Recommendations and Planned Interventions:  [X]           Bed Mobility Training             [X]    Neuromuscular Re-Education  [X]           Transfer Training                   [ ]    Orthotic/Prosthetic Training  [X]           Gait Training                          [ ]    Modalities  [X]           Therapeutic Exercises          [ ]    Edema Management/Control  [X]           Therapeutic Activities            [X]    Patient and Family Training/Education  [ ]           Other (comment):     Frequency/Duration: Patient will be followed by physical therapy 1-2 times per day/4-7 days per week to address goals. Discharge Recommendations: None  Further Equipment Recommendations for Discharge: N/A has rolling walker at home. SUBJECTIVE:   Patient stated .      OBJECTIVE DATA SUMMARY:       Past Medical History:   Diagnosis Date    Diabetes ([facility] [place] [address]      Hypertension      Skin cancer of face      Sleep apnea       Uses CPAP    [facility]roke [facility])       Past Surgical History:   Procedure Laterality Date    HX CATARACT REMOVAL        HX ROTATOR CUFF REPAIR         Barriers to Learning/Limitations: yes;  sensory deficits-vision/hearing glasses and hearing aides. G CODES:Mobility [id number] Current  CJ= 20-39%   Goal  CI= 1-19%. The severity rating is based on the Other Modified [place] Level of Assistance Scale : 17/36   Modified [place] Level of Assistance Scale : 17/36  The 6 Item Function Outcome Measure  1) Supine to sitting edge of bed: . .2. 2) Sitting to standin... 3) Walking: . 1.. 4) Negotiating one step: 6... 5) Distance walked: . 1.. 6) Assistive devised used (if any): . 5.. Total: 17./36  Scores:  Items 1-4  0.  Independent: No assistance or supervision needed is necessary to safely perform the activity (with or without an assistive device/aid)    1. Standby: Nearby supervision is required; no contact necessary   2. Minimal: One point of contact is necessary, including helping with the application of the assistive device, getting legs on/off leg rest, and stabilizing the assistive device. 3. Moderate: Two points of contact needed (1-2 people)  4. Maximal: Significant support - 3 or more points of contact (>1 person)  5. Failed: Attempted activity but failed with maximal assistance  6. Not tested: Test was not attempted due to medical reasons or reasons of safety   Item: 5  0.  >40 m  1.  26-40 m  2.  10-25 m  3.  5-9 m  4.  3-4 m  5.  2 m  6. : < 2 m  Item 6  0. No assistive device  1.  1 stick or crutch  2.  2 sticks  3.  2 elbow crutches  4.  2 axillary crutches  5. Frame (standard or wheelie)   6. Gutter/platform frame, standing , hoist, or unsafe to use aid. Eval Complexity: History: MEDIUM  Complexity : 1-2 comorbidities / personal factors will impact the outcome/ POC Exam:MEDIUM Complexity : 3 Standardized tests and measures addressing body structure, function, activity limitation and / or participation in recreation  Presentation: MEDIUM Complexity : Evolving with changing characteristics  Clinical Decision Making:Medium Complexity Modified [place] Level of Assistance Scale : 17/36 Overall Complexity:MEDIUM     Prior Level of Function/Home Situation: I with adl's and ambulation without assistive device. Home Situation  Home Environment: Private residence  # Steps to Enter: 4  Rails to Enter: Yes  Wheelchair Ramp: Yes  One/Two Story Residence: One story  Living Alone: No  Support Systems: Family member(s)  Patient Expects to be Discharged to[de-identified] Private residence  Current DME Used/Available at Home: [place], straight, Grab bars, Walker, rolling  Tub or Shower Type: Tub/Shower combination  Critical Behavior:  Neurologic State: Alert  Orientation Level: Oriented X4  Cognition: Follows commands; Appropriate decision making  Safety/Judgement: Awareness of environment  Psychosocial  Patient Behaviors: Calm; Cooperative  Skin Integrity: Incision (comment)  Strength:    Strength: Within functional limits (4+/5 both legs knee ext hip flex and ankle df)  Tone & Sensation:   Tone: Normal  Sensation: Intact  Range Of Motion:  AROM: Within functional limits  PROM: Within functional limits  Functional Mobility:  Bed Mobility:  Rolling: Minimum assistance; Additional time;Stand-by asssistance  Supine to Sit: Contact guard assistance;Minimum assistance; Additional time;Assist x1  Sit to Supine: Contact guard assistance;Minimum assistance; Additional time;Assist x1  Transfers:  Sit to Stand: Minimum assistance;Contact guard assistance; Additional time;Assist x1  Stand to Sit: Contact guard assistance;Minimum assistance; Additional time;Assist x1  Balance:   Sitting: Impaired  Sitting - Static: Good (unsupported)  Sitting - Dynamic: Fair (occasional)  Standing: Impaired  Standing - Static: Fair  Standing - Dynamic : Good  Ambulation/Gait Training:  Distance (ft): 45 Feet (ft) (x2)  Assistive Device: Walker, rolling  Ambulation - Level of Assistance: Contact guard assistance;Minimal assistance; Additional time;Assist x1  Gait Description (WDL): Exceptions to WDL  Gait Abnormalities: Decreased step clearance; Path deviations; Step to gait  Base of Support: Center of gravity altered  Speed/Zoila: Slow  Step Length: Left shortened;Right shortened  Interventions: Safety awareness training;Verbal cues; Visual/Demos  Pain:  Pre treatment pain level:0  Post treatment pain level:0  Pain Scale 1: Numeric (0 - 10)  Pain Intensity 1: 0  Activity Tolerance:   Fair   Please refer to the flowsheet for vital signs taken during this treatment.   After treatment:   [ ]         Patient left in no apparent distress sitting up in chair  [ ]         Patient left in no apparent distress in bed  [X]         Call bell left within reach  [ ]         Nursing notified  [ ] Caregiver present  [ ]         Bed alarm activated      COMMUNICATION/EDUCATION:   [X]         Fall prevention education was provided and the patient/caregiver indicated understanding. [X]         Patient/family have participated as able in goal setting and plan of care. [X]         Patient/family agree to work toward stated goals and plan of care. [ ]         Patient understands intent and goals of therapy, but is neutral about his/her participation. [ ]         Patient is unable to participate in goal setting and plan of care. Patient educated on the role of physical therapy during the acute stay  and the importance of mobility. VU. Needs reinforcement. Written handout given to patient    [address]     DO NOT TWIST your back. DO NOT BEND to  objects. Use the Squat Lift method or use a *REACHER STICK. Get out of bed using the Log Roll method. DO NOT LIFT more than 5 pounds until cleared by your physician. DO NOT RIDE in a car except to go home from the hospital or to see your physician. DO NOT DRIVE until cleared by your physician. Limit sitting to less than one hour at a time. Use a long handled back brush/sponge to wash your feet if you cannot reach them. Squat or sit on a chair when removing items from the refrigerator. Put all frequently used kitchen items within easy reach. Sit to put on pants, socks, and shoes. Do not perform lower body dressing while standing up. Carry items close to your body. If needed, sit on a shower chair and use an extended hand-held shower for bathing. Do not shower until cleared by physician. Conserve energy by pacing  yourself during your daily activities. *Reachers are available at local drug stores for about $10 - $15 or can be ordered from a catalog provided by the therapy department.   In drug stores they are often sold  under the name of [employer] [place].         Thank you for this referral.  Jose Braden, PT   Time Calculation: 25 mins

## 2017-04-17 NOTE — PERIOP NOTES
TRANSFER - OUT REPORT:    Verbal report given to Allegra (name) on Bossman Gómez  being transferred to [facility] (unit) for routine progression of care       Report consisted of patients Situation, Background, Assessment and   Recommendations(SBAR). Information from the following report(s) SBAR, Kardex and Procedure Summary was reviewed with the receiving nurse. Lines:   Peripheral IV 04/17/17 (Active)   Site Assessment Clean, dry, & intact 4/17/2017  9:23 AM   Phlebitis Assessment 0 4/17/2017  9:23 AM   Infiltration Assessment 0 4/17/2017  9:23 AM   Dressing Status Clean, dry, & intact 4/17/2017  9:23 AM   Dressing Type Transparent;Tape 4/17/2017  9:23 AM   Hub Color/Line Status Pink 4/17/2017  9:23 AM        Opportunity for questions and clarification was provided.       Patient transported with:   Tech       Visit Vitals    /84    Pulse (!) 58    Temp 99.4 °F (37.4 °C)    Resp 17    Ht 5' 6\" (1.676 m)    Wt 101.2 kg (223 lb)    SpO2 96%    BMI 35.99 kg/m2

## 2017-04-17 NOTE — ANESTHESIA PREPROCEDURE EVALUATION
Anesthetic History   No history of anesthetic complications            Review of Systems / Medical History  Patient summary reviewed and pertinent labs reviewed    Pulmonary        Sleep apnea: CPAP           Neuro/Psych         TIA     Cardiovascular    Hypertension: well controlled              Exercise tolerance: <4 METS     GI/Hepatic/Renal                Endo/Other    Diabetes: well controlled, type 2         Other Findings   Comments:   Risk Factors for Postoperative nausea/vomiting:       History of postoperative nausea/vomiting? NO       Female? NO       Motion sickness? NO       Intended opioid administration for postoperative analgesia?   YES           Physical Exam    Airway  Mallampati: II  TM Distance: > 6 cm  Neck ROM: normal range of motion   Mouth opening: Normal     Cardiovascular  Regular rate and rhythm,  S1 and S2 normal,  no murmur, click, rub, or gallop             Dental    Dentition: Edentulous     Pulmonary  Breath sounds clear to auscultation               Abdominal  GI exam deferred       Other Findings            Anesthetic Plan    ASA: 3  Anesthesia type: general      Post-op pain plan if not by surgeon: IV PCA    Induction: Intravenous  Anesthetic plan and risks discussed with: Patient

## 2017-04-17 NOTE — ANESTHESIA POSTPROCEDURE EVALUATION
Post-Anesthesia Evaluation and Assessment    Patient: Adia Arellano MRN: [id number]  SSN: [id number]    YOB: 1940  Age: 78 y.o. Sex: male      Data from PACU flowsheet    Cardiovascular Function/Vital Signs  Visit Vitals    /80    Pulse 76    Temp 37.4 °C (99.4 °F)    Resp 16    Ht 5' 6\" (1.676 m)    Wt 101.2 kg (223 lb)    SpO2 95%    BMI 35.99 kg/m2       Patient is status post general anesthesia for Procedure(s):  lumbar 2-4 laminectomy. Nausea/Vomiting: controlled    Postoperative hydration reviewed and adequate. Pain:  Pain Scale 1: Numeric (0 - 10) (04/17/17 0953)  Pain Intensity 1: 0 (04/17/17 0953)   Managed      Mental Status and Level of Consciousness: Alert and oriented     Pulmonary Status:   O2 Device: Oxygen mask (04/17/17 0929)   Adequate oxygenation and airway patent    Complications related to anesthesia: None    Post-anesthesia assessment completed.  No concerns    Signed By: Lino Lee MD     April 17, 2017

## 2017-04-18 PROBLEM — Z98.890 S/P LUMBAR LAMINECTOMY: Status: ACTIVE | Noted: 2017-04-18

## 2017-04-18 LAB
ALBUMIN SERPL BCP-MCNC: 3.6 G/DL (ref 3.4–5)
ALBUMIN/GLOB SERPL: 1.4 {RATIO} (ref 0.8–1.7)
ALP SERPL-CCNC: 65 U/L (ref 45–117)
ALT SERPL-CCNC: 32 U/L (ref 16–61)
ANION GAP BLD CALC-SCNC: 11 MMOL/L (ref 3–18)
AST SERPL W P-5'-P-CCNC: 19 U/L (ref 15–37)
ATRIAL RATE: 70 BPM
BASOPHILS # BLD AUTO: 0 K/UL (ref 0–0.06)
BASOPHILS # BLD: 0 % (ref 0–2)
BILIRUB SERPL-MCNC: 0.7 MG/DL (ref 0.2–1)
BUN SERPL-MCNC: 35 MG/DL (ref 7–18)
BUN/CREAT SERPL: 19 (ref 12–20)
CALCIUM SERPL-MCNC: 8.5 MG/DL (ref 8.5–10.1)
CALCULATED P AXIS, ECG09: 82 DEGREES
CALCULATED R AXIS, ECG10: 132 DEGREES
CALCULATED T AXIS, ECG11: 25 DEGREES
CHLORIDE SERPL-SCNC: 97 MMOL/L (ref 100–108)
CO2 SERPL-SCNC: 28 MMOL/L (ref 21–32)
CREAT SERPL-MCNC: 1.87 MG/DL (ref 0.6–1.3)
DIAGNOSIS, 93000: NORMAL
DIFFERENTIAL METHOD BLD: ABNORMAL
EOSINOPHIL # BLD: 0 K/UL (ref 0–0.4)
EOSINOPHIL NFR BLD: 0 % (ref 0–5)
ERYTHROCYTE [DISTWIDTH] IN BLOOD BY AUTOMATED COUNT: 13.4 % (ref 11.6–14.5)
EST. AVERAGE GLUCOSE BLD GHB EST-MCNC: 183 MG/DL
GLOBULIN SER CALC-MCNC: 2.5 G/DL (ref 2–4)
GLUCOSE BLD STRIP.AUTO-MCNC: 140 MG/DL (ref 70–110)
GLUCOSE BLD STRIP.AUTO-MCNC: 249 MG/DL (ref 70–110)
GLUCOSE BLD STRIP.AUTO-MCNC: 255 MG/DL (ref 70–110)
GLUCOSE BLD STRIP.AUTO-MCNC: 264 MG/DL (ref 70–110)
GLUCOSE SERPL-MCNC: 235 MG/DL (ref 74–99)
HBA1C MFR BLD: 8 % (ref 4.2–5.6)
HCT VFR BLD AUTO: 43.5 % (ref 36–48)
HGB BLD-MCNC: 14.3 G/DL (ref 13–16)
LYMPHOCYTES # BLD AUTO: 5 % (ref 21–52)
LYMPHOCYTES # BLD: 0.6 K/UL (ref 0.9–3.6)
MCH RBC QN AUTO: 30.6 PG (ref 24–34)
MCHC RBC AUTO-ENTMCNC: 32.9 G/DL (ref 31–37)
MCV RBC AUTO: 93.1 FL (ref 74–97)
MONOCYTES # BLD: 0.3 K/UL (ref 0.05–1.2)
MONOCYTES NFR BLD AUTO: 2 % (ref 3–10)
NEUTS SEG # BLD: 12.6 K/UL (ref 1.8–8)
NEUTS SEG NFR BLD AUTO: 93 % (ref 40–73)
P-R INTERVAL, ECG05: 376 MS
PLATELET # BLD AUTO: 142 K/UL (ref 135–420)
PMV BLD AUTO: 12.3 FL (ref 9.2–11.8)
POTASSIUM SERPL-SCNC: 5 MMOL/L (ref 3.5–5.5)
PROT SERPL-MCNC: 6.1 G/DL (ref 6.4–8.2)
Q-T INTERVAL, ECG07: 408 MS
QRS DURATION, ECG06: 148 MS
QTC CALCULATION (BEZET), ECG08: 440 MS
RBC # BLD AUTO: 4.67 M/UL (ref 4.7–5.5)
SODIUM SERPL-SCNC: 136 MMOL/L (ref 136–145)
VENTRICULAR RATE, ECG03: 70 BPM
WBC # BLD AUTO: 13.5 K/UL (ref 4.6–13.2)

## 2017-04-18 PROCEDURE — G8987 SELF CARE CURRENT STATUS: HCPCS

## 2017-04-18 PROCEDURE — 80053 COMPREHEN METABOLIC PANEL: CPT | Performed by: HOSPITALIST

## 2017-04-18 PROCEDURE — 74011250637 HC RX REV CODE- 250/637: Performed by: NEUROLOGICAL SURGERY

## 2017-04-18 PROCEDURE — 77030034849

## 2017-04-18 PROCEDURE — 97535 SELF CARE MNGMENT TRAINING: CPT

## 2017-04-18 PROCEDURE — 97116 GAIT TRAINING THERAPY: CPT

## 2017-04-18 PROCEDURE — 74011250637 HC RX REV CODE- 250/637: Performed by: HOSPITALIST

## 2017-04-18 PROCEDURE — 97165 OT EVAL LOW COMPLEX 30 MIN: CPT

## 2017-04-18 PROCEDURE — 99218 HC RM OBSERVATION: CPT

## 2017-04-18 PROCEDURE — 74011636637 HC RX REV CODE- 636/637: Performed by: HOSPITALIST

## 2017-04-18 PROCEDURE — 74011250636 HC RX REV CODE- 250/636: Performed by: NEUROLOGICAL SURGERY

## 2017-04-18 PROCEDURE — G8988 SELF CARE GOAL STATUS: HCPCS

## 2017-04-18 PROCEDURE — 74011000258 HC RX REV CODE- 258: Performed by: NEUROLOGICAL SURGERY

## 2017-04-18 PROCEDURE — 74011000250 HC RX REV CODE- 250: Performed by: NEUROLOGICAL SURGERY

## 2017-04-18 PROCEDURE — 82962 GLUCOSE BLOOD TEST: CPT

## 2017-04-18 PROCEDURE — 77030011943

## 2017-04-18 PROCEDURE — 51798 US URINE CAPACITY MEASURE: CPT

## 2017-04-18 PROCEDURE — 65270000029 HC RM PRIVATE

## 2017-04-18 PROCEDURE — 36415 COLL VENOUS BLD VENIPUNCTURE: CPT | Performed by: HOSPITALIST

## 2017-04-18 PROCEDURE — 83036 HEMOGLOBIN GLYCOSYLATED A1C: CPT | Performed by: HOSPITALIST

## 2017-04-18 PROCEDURE — 85025 COMPLETE CBC W/AUTO DIFF WBC: CPT | Performed by: HOSPITALIST

## 2017-04-18 PROCEDURE — 97530 THERAPEUTIC ACTIVITIES: CPT

## 2017-04-18 RX ORDER — INSULIN LISPRO 100 [IU]/ML
INJECTION, SOLUTION INTRAVENOUS; SUBCUTANEOUS
Status: DISCONTINUED | OUTPATIENT
Start: 2017-04-18 | End: 2017-04-19 | Stop reason: HOSPADM

## 2017-04-18 RX ORDER — DEXTROSE 50 % IN WATER (D50W) INTRAVENOUS SYRINGE
25-50 AS NEEDED
Status: DISCONTINUED | OUTPATIENT
Start: 2017-04-18 | End: 2017-04-19 | Stop reason: HOSPADM

## 2017-04-18 RX ORDER — ATORVASTATIN CALCIUM 20 MG/1
20 TABLET, FILM COATED ORAL DAILY
Status: DISCONTINUED | OUTPATIENT
Start: 2017-04-18 | End: 2017-04-19 | Stop reason: HOSPADM

## 2017-04-18 RX ORDER — FAMOTIDINE 20 MG/1
20 TABLET, FILM COATED ORAL DAILY
Status: DISCONTINUED | OUTPATIENT
Start: 2017-04-19 | End: 2017-04-19 | Stop reason: HOSPADM

## 2017-04-18 RX ORDER — MAGNESIUM SULFATE 100 %
4 CRYSTALS MISCELLANEOUS AS NEEDED
Status: DISCONTINUED | OUTPATIENT
Start: 2017-04-18 | End: 2017-04-19 | Stop reason: HOSPADM

## 2017-04-18 RX ADMIN — TELMISARTAN 80 MG: 40 TABLET ORAL at 09:15

## 2017-04-18 RX ADMIN — GABAPENTIN 1200 MG: 400 CAPSULE ORAL at 21:43

## 2017-04-18 RX ADMIN — INSULIN DETEMIR 7 UNITS: 100 INJECTION, SOLUTION SUBCUTANEOUS at 09:19

## 2017-04-18 RX ADMIN — LORATADINE 10 MG: 10 TABLET ORAL at 09:15

## 2017-04-18 RX ADMIN — TESTOSTERONE 4 PUMP(S): 10 GEL, METERED TOPICAL at 16:48

## 2017-04-18 RX ADMIN — PSYLLIUM HUSK 1 PACKET: 3.4 POWDER ORAL at 09:14

## 2017-04-18 RX ADMIN — INSULIN LISPRO 9 UNITS: 100 INJECTION, SOLUTION INTRAVENOUS; SUBCUTANEOUS at 16:48

## 2017-04-18 RX ADMIN — DEXAMETHASONE SODIUM PHOSPHATE 4 MG: 4 INJECTION, SOLUTION INTRAMUSCULAR; INTRAVENOUS at 05:35

## 2017-04-18 RX ADMIN — DULOXETINE HYDROCHLORIDE 60 MG: 30 CAPSULE, DELAYED RELEASE ORAL at 09:16

## 2017-04-18 RX ADMIN — ZOLPIDEM TARTRATE 5 MG: 5 TABLET, FILM COATED ORAL at 21:43

## 2017-04-18 RX ADMIN — Medication 10 ML: at 21:45

## 2017-04-18 RX ADMIN — HYDROCHLOROTHIAZIDE 12.5 MG: 25 TABLET ORAL at 09:16

## 2017-04-18 RX ADMIN — FAMOTIDINE 20 MG: 20 TABLET, FILM COATED ORAL at 09:16

## 2017-04-18 RX ADMIN — VERAPAMIL HYDROCHLORIDE 180 MG: 180 TABLET, FILM COATED, EXTENDED RELEASE ORAL at 09:16

## 2017-04-18 RX ADMIN — ATORVASTATIN CALCIUM 20 MG: 20 TABLET, FILM COATED ORAL at 21:43

## 2017-04-18 RX ADMIN — GABAPENTIN 1200 MG: 400 CAPSULE ORAL at 15:23

## 2017-04-18 RX ADMIN — CLINDAMYCIN PHOSPHATE 900 MG: 150 INJECTION, SOLUTION, CONCENTRATE INTRAVENOUS at 03:41

## 2017-04-18 RX ADMIN — FENOFIBRATE 145 MG: 145 TABLET, FILM COATED ORAL at 09:15

## 2017-04-18 RX ADMIN — GABAPENTIN 1200 MG: 400 CAPSULE ORAL at 09:15

## 2017-04-18 RX ADMIN — INSULIN LISPRO 9 UNITS: 100 INJECTION, SOLUTION INTRAVENOUS; SUBCUTANEOUS at 12:42

## 2017-04-18 RX ADMIN — OXYCODONE HYDROCHLORIDE AND ACETAMINOPHEN 1 TABLET: 10; 325 TABLET ORAL at 12:17

## 2017-04-18 RX ADMIN — INSULIN DETEMIR 10 UNITS: 100 INJECTION, SOLUTION SUBCUTANEOUS at 21:00

## 2017-04-18 RX ADMIN — OXYCODONE HYDROCHLORIDE AND ACETAMINOPHEN 1 TABLET: 10; 325 TABLET ORAL at 02:15

## 2017-04-18 RX ADMIN — Medication 10 ML: at 15:28

## 2017-04-18 NOTE — PROGRESS NOTES
S: c/o incisional pain and unable to void, legs feel good  O: afeb, incision dry, neuro intact  A: stable except for voiding issue  P: place Landers and mobilize and voiding trial tomorrow.

## 2017-04-18 NOTE — PROGRESS NOTES
Problem: Mobility Impaired (Adult and Pediatric)  Goal: *Acute Goals and Plan of Care (Insert Text)  Physical Therapy Goals  Initiated 4/17/2017 and to be accomplished within 7 day(s) following back precautions   1. Patient will move from supine to sit and sit to supine , scoot up and down and roll side to side in bed with modified independence. 2. Patient will transfer from bed to chair and chair to bed with modified independence using the least restrictive device. 3. Patient will perform sit to stand with modified independence. 4. Patient will ambulate with modified independence for 300 feet with the least restrictive device. 5. Patient will ascend/descend 4 stairs with handrail(s) with supervision/set-up  6. Patient will ascend and descend ramp to egress home independently. .   Outcome: Progressing Towards Goal  PHYSICAL THERAPY TREATMENT     Patient: Migdalia Day (58 y.o. male)  Date: 4/18/2017  Diagnosis: lumbar stenosis   m48.06  Lumbar stenosis Lumbar stenosis  Procedure(s) (LRB):  lumbar 2-4 laminectomy (N/A) 1 Day Post-Op  Precautions: Back, Fall  Chart, physical therapy assessment, plan of care and goals were reviewed. ASSESSMENT:  Patient in bed and moved to sitting , standing and ambulated with rolling walker 55 feet x4 larger steps today still decreased step clearance. No pain reported pre or post treatment education on plan of next visit and ambulationverbalized understanding. left up in chair   Progression toward goals:  [ ]      Improving appropriately and progressing toward goals  [X]      Improving slowly and progressing toward goals  [ ]      Not making progress toward goals and plan of care will be adjusted       PLAN:  Patient continues to benefit from skilled intervention to address the above impairments. Continue treatment per established plan of care.   Discharge Recommendations:  None  Further Equipment Recommendations for Discharge:  rolling walker and N/A       SUBJECTIVE: Patient stated .      OBJECTIVE DATA SUMMARY:   Critical Behavior:  Neurologic State: Alert  Orientation Level: Oriented X4  Cognition: Follows commands  Safety/Judgement: Awareness of environment  Functional Mobility Training:  Bed Mobility:  Rolling:  (not assessed; pt up at EOB on arrival)  Supine to Sit: Stand-by asssistance;Contact guard assistance  Sit to Supine:  (left in chair )  Transfers:  Sit to Stand: Stand-by asssistance  Stand to Sit: Stand-by asssistance  Balance:  Sitting: Intact  Sitting - Static: Good (unsupported)  Sitting - Dynamic: Good (unsupported)  Standing: Impaired  Standing - Static: Good  Standing - Dynamic :  (fair plus)  Ambulation/Gait Training:  Distance (ft): 55 Feet (ft) (x4)  Assistive Device: Walker, rolling  Ambulation - Level of Assistance: Contact guard assistance;Stand-by asssistance; Additional time;Assist x1  Gait Abnormalities: Decreased step clearance  Base of Support: Widened  Speed/Zoila: Slow  Interventions: Safety awareness training     Pain: pre and post  0/10  Pain Scale 1: Numeric (0 - 10)  Activity Tolerance:   Fair plus   Please refer to the flowsheet for vital signs taken during this treatment.   After treatment:   [X] Patient left in no apparent distress sitting up in chair  [ ] Patient left in no apparent distress in bed  [X] Call bell left within reach  [X] Nursing notified  [ ] Caregiver present  [ ] Bed alarm activated      Yarona Sender, PT   Time Calculation: 16 mins

## 2017-04-18 NOTE — PROGRESS NOTES
OT order received and chart reviewed. 807: 1st attempt for OT evaluation. Pt sitting at EOB with breakfast set up and nursing student present. Will follow up shortly.     Thank you for the referral.    Liam Pizarro MS OTR/L  Office Ext: [phone]  Pager: [phone]

## 2017-04-18 NOTE — PROGRESS NOTES
1915 Pt received after report given from [id number] [facility]  2300 Pt resting in bed  0121 Bladder scan 884cc  0130 Straight cath 725 cc urine  0145 When pt was oob there were pretzels and other finger food in his bed. Informed Pt of his HA1C. Pt stated 'I know'  0550 Landers inserted do to unable to void  Bedside and Verbal shift change report given to Robby Brandt (oncoming nurse) by Trent Abad RN (offgoing nurse). Report included the following information SBAR, Kardex and MAR.

## 2017-04-18 NOTE — PROGRESS NOTES
Certified Lorene Goodwin provider rounded on Maury Vidal to provide education related to sleep apnea after chart review for risk factors. Risk factors include:  1. Current CPAP user  2. TIA  3. HTN  4. Mallampati 2  STOP BANG score 8      Patient Education:  1. Patient is not having any issues with PAP machine. Patient said that he loves it and is getting supplies regularly. Patient was instructed to contact his DME provider should any issues arise with PAP machine. Recommendations:  1. Continue to follow up with sleep specialist for treatment and management.

## 2017-04-18 NOTE — PROGRESS NOTES
Reason for Renal Dosing:  Per Renal Dosing Policy    Patient clinical status and labs ordered/reviewed. Pt Weight Weight: 101.2 kg (223 lb)   Serum Creatinine Lab Results   Component Value Date/Time    Creatinine 1.87 04/18/2017 03:30 AM       Creatinine Clearance Estimated Creatinine Clearance: 38.6 mL/min (based on Cr of 1.87). BUN Lab Results   Component Value Date/Time    BUN 35 04/18/2017 03:30 AM       WBC Lab Results   Component Value Date/Time    WBC 13.5 04/18/2017 03:30 AM      Temperature Temp: 97.5 °F (36.4 °C)   HR Pulse (Heart Rate): 80     BP BP: 143/74           Drug type: H2 Receptor Blocer      Drug/dose: famotidine has been renally dosed to 20 mg po daily. Continue to monitor.     Signed Joe Mauricio PHARMD  Date 4/18/2017  Time 4:59 PM

## 2017-04-18 NOTE — PROGRESS NOTES
Problem: Self Care Deficits Care Plan (Adult)  Goal: *Acute Goals and Plan of Care (Insert Text)  Occupational Therapy Goals  Initiated 4/18/2017 within 7 day(s). 1. Patient will perform grooming tasks at sink with supervision for safety. 2. Patient will perform lower body dressing with modified independence, utilizing AE, prn.  3. Patient will perform functional task while standing for 5 minutes with supervision for balance to increase activity tolerance for ADLs. 4. Patient will perform toilet transfers with modified independence. 5. Patient will perform all aspects of toileting with independence. 6. Patient will participate in upper extremity therapeutic exercise/activities with supervision for 8 minutes to maximize strength/function of BUEs for ADLs. 7. Patient will utilize energy conservation techniques during functional activities with minimal verbal cues. Outcome: Progressing Towards Goal  OCCUPATIONAL THERAPY EVALUATION     Patient: Addis Campo (64 y.o. male)  Date: 4/18/2017  Primary Diagnosis: lumbar stenosis   m48.06  Lumbar stenosis  Procedure(s) (LRB):  lumbar 2-4 laminectomy (N/A) 1 Day Post-Op   Precautions:  Fall, Back      ASSESSMENT :  Based on the objective data described below, the patient presents with impairments with regard to overall independence in ADLs secondary to recent back surgery. Pt at EOB with nursing student on arrival; c/o 0/10 pain. Reinforced back precautions on arrival; pt demo'd and verbalized understanding. Pt stood with supervision using RW; min A for management of treadwell catheter and IV lines. Pt maneuvered to chair with stand-by assist; pt grimaced upon sitting down and reported it was not due to back pain, pain with regard to treadwell catheter. Pt requires max A for LB dressing/bathing secondary to back precautions; will benefit from AE training.  Pt left up in chair with family and nursing student present; needs within reach; soda spilled all over the floor; notified house keeping. Recommend continued therapy to maximize independence in ADLs. Vitals  BP: 139/72  91% O2 (room air)     Patient will benefit from skilled intervention to address the above impairments. Patients rehabilitation potential is considered to be Good  Factors which may influence rehabilitation potential include:   [ ]             None noted  [ ]             Mental ability/status  [X]             Medical condition  [ ]             Home/family situation and support systems  [ ]             Safety awareness  [ ]             Pain tolerance/management  [ ]             Other:      Recommendations for nursing: up with assist  Written on communication board: yes          PLAN :  Recommendations and Planned Interventions:  [X]               Self Care Training                  [X]        Therapeutic Activities  [X]               Functional Mobility Training    [ ]        Cognitive Retraining  [X]               Therapeutic Exercises           [X]        Endurance Activities  [X]               Balance Training                   [ ]        Neuromuscular Re-Education  [ ]               Visual/Perceptual Training     [X]   Home Safety Training  [X]               Patient Education                 [X]        Family Training/Education  [ ]               Other (comment):     Frequency/Duration: Patient will be followed by occupational therapy 4-7  times a week to address goals. Discharge Recommendations: Home Health  Further Equipment Recommendations for Discharge: bedside commode, shower chair        SUBJECTIVE:   Patient stated No pain, I feel better than I did before the surgery. \"      OBJECTIVE DATA SUMMARY:       Past Medical History:   Diagnosis Date    Diabetes ([facility] [address]      Hypertension      Skin cancer of face      Sleep apnea       Uses CPAP    Stroke [facility])       Past Surgical History:   Procedure Laterality Date    HX CATARACT REMOVAL        HX ROTATOR CUFF REPAIR         Barriers to Learning/Limitations: None  Compensate with: visual, verbal, tactile, kinesthetic cues/model     GCODES:  Self Care  Current  CL= 60-79%   Goal  CI= 1-19%. The severity rating is based on the Other Functional Assessment, MMT, ROm     Eval Complexity: History: LOW Complexity : Brief history review ; Examination: LOW Complexity : 1-3 performance deficits relating to physical, cognitive , or psychosocial skils that result in activity limitations and / or participation restrictions ; Decision Making:MEDIUM Complexity : Patient may present with comorbidities that affect occupational performnce. Miniml to moderate modification of tasks or assistance (eg, physical or verbal ) with assesment(s) is necessary to enable patient to complete evaluation      Prior Level of Function/Home Situation: Pt was independent with basic self care tasks and functional mobility PTA. Pt reports he had difficulty donning/doffing R sock secondary to pain PTA. Home Situation  Home Environment: Private residence  # Steps to Enter: 4  Rails to Enter: Yes  Wheelchair Ramp: Yes  One/Two Story Residence: One story  Living Alone: No  Support Systems: Child(lakhwinder), Family member(s)  Patient Expects to be Discharged to[de-identified] Private residence  Current DME Used/Available at Home: None  Tub or Shower Type: Tub/Shower combination (has grab bars; no shower chair)  [X]  Right hand dominant          [ ]  Left hand dominant     Cognitive/Behavioral Status:  Neurologic State: Alert  Orientation Level: Oriented X4  Cognition: Follows commands; Appropriate decision making  Safety/Judgement: Awareness of environment      Skin: Intact (BUEs)  Edema: None noted (BUEs)     Vision/Perceptual:    Acuity: Able to read clock/calendar on wall without difficulty    Corrective Lenses: Glasses      Coordination:  Coordination: Within functional limits (BUes)  Fine Motor Skills-Upper: Right Intact; Left Intact    Gross Motor Skills-Upper: Right Intact; Left Intact Balance:  Sitting: Intact  Sitting - Static: Good (unsupported)  Sitting - Dynamic: Fair (occasional) (Fair+)  Standing: Intact; With support  Standing - Static: Good  Standing - Dynamic : Fair (Fair+)     Strength:  Strength: Within functional limits (BUEs: 5/5)     Tone & Sensation:  Tone: Normal (BUes)  Sensation: Intact (BUEs)     Range of Motion:  AROM: Within functional limits (BUEs: full shoulder/elbow flexion)  PROM: Within functional limits (BUEs)     Functional Mobility and Transfers for ADLs:  Bed Mobility:  Rolling:  (not assessed; pt up at EOB on arrival)  Sit to Supine:  (not assessed; pt left up in recliner chair )      Transfers:  Sit to Stand: Stand-by asssistance              Toilet Transfer :  (not assessed)                ADL Assessment:  Feeding: Setup;Supervision  Oral Facial Hygiene/Grooming: Setup;Supervision  Bathing: Maximum assistance (secondary to back precautions)  Upper Body Dressing: Setup;Supervision  Lower Body Dressing: Maximum assistance (secondary to back precautions)  Toileting: Total assistance (treadwell catheter)     Cognitive Retraining  Safety/Judgement: Awareness of environment     Pain:  Pre treatment pain level: 0/10  Post treatment pain level: 0/10  Pain Scale 1: Numeric (0 - 10)     Activity Tolerance:  Fair+  Please refer to the flowsheet for vital signs taken during this treatment. After treatment:   [X] Patient left in no apparent distress sitting up in chair  [ ] Patient left in no apparent distress in bed  [X] Call bell left within reach  [X] Nursing notified/nursing student present  [X] Caregiver present  [ ] Bed alarm activated      COMMUNICATION/EDUCATION: Patient educated on role of OT, POC, and home safety. He verbalized understanding.   [X] Home safety education was provided and the patient/caregiver indicated understanding. [X] Patient/family have participated as able in goal setting and plan of care.   [X] Patient/family agree to work toward stated goals and plan of care. [ ] Patient understands intent and goals of therapy, but is neutral about his/her participation. [ ] Patient is unable to participate in goal setting and plan of care. Thank you for this referral.     Philly Camejo, MS OTR/L  Time Calculation: 18 mins       [address]     DO NOT TWIST your back. DO NOT BEND to  objects. Use the Squat Lift method or use a *REACHER STICK. Get out of bed using the Log Roll method. DO NOT LIFT more than 5 pounds until cleared by your physician. DO NOT RIDE in a car except to go home from the hospital or to see your physician. DO NOT DRIVE until cleared by your physician. Limit sitting to less than one hour at a time. Use a long handled back brush/sponge to wash your feet if you cannot reach them. Squat or sit on a chair when removing items from the refrigerator. Put all frequently used kitchen items within easy reach. Sit to put on pants, socks, and shoes. Do not perform lower body dressing while standing up. Carry items close to your body. If needed, sit on a shower chair and use an extended hand-held shower for bathing. Do not shower until cleared by physician. Conserve energy by pacing  yourself during your daily activities. *Reachers are available at local drug stores for about $10 - $15 or can be ordered from a catalog provided by the therapy department. In drug stores they are often sold  under the name of Yao Segura. 

## 2017-04-18 NOTE — PROGRESS NOTES
Patient and/or next of kin has been given and has signed the [facility] Outpatient Observation  Notification letter and all questions answered. Copy of this notice given to patient and copy placed on chart. Patient and/or next of kin has been given the Outpatient Observation Information and Notification letter and all questions answered.

## 2017-04-18 NOTE — PROGRESS NOTES
Progress Note      Patient: Ralph Doan               Sex: male          DOA: 4/17/2017       YOB: 1940      Age:  78 y.o.        LOS:  LOS: 0 days               Subjective:   Pt was seen sitting up in the chair and said he is not having any pain from his surgery . The plan is to send him home in the am.    the pt's blood glucose is high but he will resume his usual regimen tomorrow when he goes home . Objective:      Visit Vitals    /74    Pulse 80    Temp 97.5 °F (36.4 °C)    Resp 20    Ht 5' 6\" (1.676 m)    Wt 101.2 kg (223 lb)    SpO2 90%    BMI 35.99 kg/m2       Physical Exam:  Pt is awake and is alert   Heart reg rate and rhythm   Lungs good breath sounds heard   Abdomen soft and obese   Neuro nonfoical        Lab/Data Reviewed:  CMP:   Lab Results   Component Value Date/Time     04/18/2017 03:30 AM    K 5.0 04/18/2017 03:30 AM    CL 97 (L) 04/18/2017 03:30 AM    CO2 28 04/18/2017 03:30 AM    AGAP 11 04/18/2017 03:30 AM     (H) 04/18/2017 03:30 AM    BUN 35 (H) 04/18/2017 03:30 AM    CREA 1.87 (H) 04/18/2017 03:30 AM    GFRAA 43 (L) 04/18/2017 03:30 AM    GFRNA 35 (L) 04/18/2017 03:30 AM    CA 8.5 04/18/2017 03:30 AM    ALB 3.6 04/18/2017 03:30 AM    TP 6.1 (L) 04/18/2017 03:30 AM    GLOB 2.5 04/18/2017 03:30 AM    AGRAT 1.4 04/18/2017 03:30 AM    SGOT 19 04/18/2017 03:30 AM    ALT 32 04/18/2017 03:30 AM     CBC:   Lab Results   Component Value Date/Time    WBC 13.5 (H) 04/18/2017 03:30 AM    HGB 14.3 04/18/2017 03:30 AM    HCT 43.5 04/18/2017 03:30 AM     04/18/2017 03:30 AM           Assessment/Plan     Principal Problem:    Lumbar stenosis (4/17/2017)    Active Problems:    S/P lumbar laminectomy (4/18/2017)  Diabetes mellitus .   htn  Sleep apnea    Plan:pt will go home in the am

## 2017-04-18 NOTE — PROGRESS NOTES
Rolling walker delivered to the pt's room from the [facility] [employer]. Referral and signed delivery ticket faxed to [facility].

## 2017-04-18 NOTE — PROGRESS NOTES
1252 instructed patient to use ICS , pulse ox reading 95%, no acute distress noted, patient sitting in chair watching tv    1521 patient sitting in chair, call bell within reach, no distress noted    1924 Bedside and Verbal shift change report given to [employer] (oncoming nurse) by Christi Hernandez RN   (offgoing nurse). Report included the following information SBAR, Kardex and MAR.

## 2017-04-18 NOTE — OP NOTES
Zach Campuzano    Name:  Van Warner  MR#:  [id number]  :  1940  Account #:  [de-identified]  Date of Adm:  2017  Date of Surgery:  2017      ATTENDING PHYSICIAN:  Josué Adrian M.D. FIRST ASSISTANT:  Mr. John Martinez DIAGNOSIS:  Lumbar stenosis. POSTOPERATIVE DIAGNOSIS:  Lumbar stenosis. PRINCIPAL PROCEDURES:  L2 to L4 decompressive laminectomy,  with bilateral L2-L3, L3-L4, and L4-L5 medial facetectomies, and L2  through L5 foraminotomies, with fluoroscopic guidance. ESTIMATED BLOOD LOSS:  Minimal.    SPECIMENS REMOVED: none    ANESTHESIA:  General endotracheal.    COMPLICATIONS:  None. INDICATIONS:  This 53-year-old male presented with intractable back  and bilateral leg pain that failed prolonged conservative management. An MRI scan showed multilevel spinal stenosis. Flexion-extension x-  rays showed no instability. He was therefore admitted for a three-level  lumbar decompression. DESCRIPTION OF PROCEDURE:  After the smooth induction of  general anesthesia, Alex stockings were placed. The patient was  rolled prone onto the [facility] table and padded appropriately. The  lumbar region was shaved, prepped, and draped in the standard  fashion. Fluoroscopic guidance was used throughout the procedure. It  initially was used for aid in placement of the skin incision. A  preoperative time-out was held to correctly identify the patient and the  surgical procedure to be performed. Then, the skin was infiltrated with  8 mL of 1% Xylocaine with epinephrine, and a 10 cm midline incision  was made from the spinous process of L1 to L5 and carried down  through the subcutaneous tissues. The dorsal fascia was split to either  side of the midline with a combination of blunt and sharp dissection. The lamina of 2 through 4 were exposed out to the facet joints, and the  correct level was verified with fluoroscopy. Retraction was maintained  with a Nick self-retaining retractor. Then, utilizing Leksell and  Kerrison rongeurs, as well as the Midas Andrey drill, sequential total  laminectomies of L4, L3, and L2 were performed. The ligamentum  flavum was incised sharply near the midline and then removed  piecemeal out into the lateral gutters. Bilateral L2-L3, L3-L4, and L4-  L5 medial facetectomies and L2 through L5 foraminotomies were then  accomplished with the drill and the Kerrison rongeurs. Ultimately, the  thecal sac and nerves were all widely decompressed. Significant  lateral recess stenosis at each level was encountered and relieved. The wound was copiously irrigated with antibiotic solution, and the  dura was overlaid with Gelfoam soaked in thrombin. The retractors were removed, and muscle edge bleeding was  controlled with the bipolar cautery. Then, 30 mL of a Marcaine solution  was injected into the muscle and soft tissue. Powdered vancomycin  was placed in the deep and superficial tissues. Then, the paraspinal  muscle and the fascia were reapproximated with interrupted 0-Vicryl,  the subcutaneous tissues with 2-0 Vicryl, and the skin edges were  approximated with a 3-0 Monocryl. Counts were correct. Blood loss  was minimal, and the wound was dressed with Steri-Strips, dry gauze,  and an OpSite dressing. The patient awoke. He was extubated and  moving both lower extremities. He was transferred to recovery in  stable condition.         Margarita Gomez MD    [facility] / [address]  D:  04/17/2017   09:25  T:  04/18/2017   09:40  Job #:  [id number]

## 2017-04-18 NOTE — PROGRESS NOTES
Patient has designated his wife to participate in his/her discharge plan and to receive any needed information.      Name:   Beau Record  spouse   [phone]   Stephany. Marilynn Vines [address] [place] [id number]   April 17, 2017     Address:  Phone number:

## 2017-04-18 NOTE — PROGRESS NOTES
Problem: Self Care Deficits Care Plan (Adult)  Goal: *Acute Goals and Plan of Care (Insert Text)  Occupational Therapy Goals  Initiated 4/18/2017 within 7 day(s). 1. Patient will perform grooming tasks at sink with supervision for safety. 2. Patient will perform lower body dressing with modified independence, utilizing AE, prn.  3. Patient will perform functional task while standing for 5 minutes with supervision for balance to increase activity tolerance for ADLs. 4. Patient will perform toilet transfers with modified independence. 5. Patient will perform all aspects of toileting with independence. 6. Patient will participate in upper extremity therapeutic exercise/activities with supervision for 8 minutes to maximize strength/function of BUEs for ADLs. 7. Patient will utilize energy conservation techniques during functional activities with minimal verbal cues. Outcome: Progressing Towards Goal  OCCUPATIONAL THERAPY TREATMENT     Patient: Efren Montiel (16 y.o. male)  Date: 4/18/2017  Diagnosis: lumbar stenosis   m48.06  Lumbar stenosis  S/P lumbar laminectomy Lumbar stenosis  Procedure(s) (LRB):  lumbar 2-4 laminectomy (N/A) 1 Day Post-Op  Precautions: Back, Fall  Chart, occupational therapy assessment, plan of care, and goals were reviewed. ASSESSMENT:  Pt is pleasant and cooperative, motivated to participate w/therapy. Pt requires min vc's to demonstrates \"log roll\" technique w/bed mobility to EOB and min vc's for hand placement w/functional transfer to standing . Pt educated on use of adaptive equipment w/LB ADLs and requires increase time donning/doffing socks at chair level. Issued reacher and sock aid.   EDUCATION reviewed lumbar precautions and importance of maintaining w/ADLs and functional transfers  Progression toward goals:  [X]          Improving appropriately and progressing toward goals  [ ]          Improving slowly and progressing toward goals  [ ]          Not making progress toward goals and plan of care will be adjusted       PLAN:  Patient continues to benefit from skilled intervention to address the above impairments. Continue treatment per established plan of care. Discharge Recommendations:  Home Health  Further Equipment Recommendations for Discharge:  rolling walker       SUBJECTIVE:   Patient stated My daughter is going to be staying for a month.       OBJECTIVE DATA SUMMARY:         Cognitive/Behavioral Status:  Neurologic State: Alert  Orientation Level: Oriented X4  Cognition: Appropriate for age attention/concentration, Follows commands  Safety/Judgement: Awareness of environment  Functional Mobility and Transfers for ADLs:              Bed Mobility:  Rolling: Supervision  Supine to Sit: Supervision  Sit to Supine:  (left in chair )              Transfers:  Sit to Stand: Supervision  Bed to Chair: Modified independent (w/RW)              Toilet Transfer :  (not assessed)  Balance:  Sitting: Intact  Sitting - Static: Good (unsupported)  Sitting - Dynamic: Good (unsupported)  Standing: Intact; Without support  Standing - Static: Good  Standing - Dynamic : Good  ADL Intervention:  Lower Body Dressing Assistance  Socks: Supervision/set-up  Leg Crossed Method Used: No  Position Performed: Seated in chair  Cues: Don;Doff  Adaptive Equipment Used: Reacher;Sock aid     Cognitive Retraining  Safety/Judgement: Awareness of environment     Pain:  Pre Treatment:2  Post Treatment:1  Pain Scale 1: Numeric (0 - 10)  Pain Intensity 1: 1  Pain Location 1: Back  Pain Orientation 1: Posterior  Pain Description 1: Aching  Pain Intervention(s) 1: Declines     Activity Tolerance:    Good     Please refer to the flowsheet for vital signs taken during this treatment.   After treatment:   [X]  Patient left in no apparent distress sitting up in chair  [ ]  Patient left in no apparent distress in bed  [X]  Call bell left within reach  [X]  Nursing notified  [ ]  Caregiver present  [ ]  Bed alarm activated     TORREY Garcia  Time Calculation: 24 mins

## 2017-04-18 NOTE — PROGRESS NOTES
Problem: Mobility Impaired (Adult and Pediatric)  Goal: *Acute Goals and Plan of Care (Insert Text)  Physical Therapy Goals  Initiated 4/17/2017 and to be accomplished within 7 day(s) following back precautions   1. Patient will move from supine to sit and sit to supine , scoot up and down and roll side to side in bed with modified independence. 2. Patient will transfer from bed to chair and chair to bed with modified independence using the least restrictive device. 3. Patient will perform sit to stand with modified independence. 4. Patient will ambulate with modified independence for 300 feet with the least restrictive device. 5. Patient will ascend/descend 4 stairs with handrail(s) with supervision/set-up  6. Patient will ascend and descend ramp to egress home independently. .   Outcome: Progressing Towards Goal  PHYSICAL THERAPY TREATMENT     Patient: Ralph Doan (54 y.o. male)  Date: 4/18/2017  Diagnosis: lumbar stenosis   m48.06  Lumbar stenosis  S/P lumbar laminectomy Lumbar stenosis  Procedure(s) (LRB):  lumbar 2-4 laminectomy (N/A) 1 Day Post-Op  Precautions: Back, Fall   Chart, physical therapy assessment, plan of care and goals were reviewed. ASSESSMENT: Pt with [facility] ins, did not charge for session with this PTA. Pt sitting in chair upon entering room. SpO2 97% on 1L O2. Ambulated without an assistive device, wide LIBERTY, steady slow pace, increased trunk sway, no LOB or path deviations. SpO2 97% on RA s/p ambulation. Pt returned to sitting in chair, did not re-apply nasal cannula. Education: RW vs none, lumbar precautions. Progression toward goals:  [X]      Improving appropriately and progressing toward goals  [ ]      Improving slowly and progressing toward goals  [ ]      Not making progress toward goals and plan of care will be adjusted       PLAN:  Patient continues to benefit from skilled intervention to address the above impairments.   Continue treatment per established plan of care. Discharge Recommendations:  None  Further Equipment Recommendations for Discharge:  N/A       SUBJECTIVE:   Patient stated I think I am doing pretty good for the 2nd day.       OBJECTIVE DATA SUMMARY:   Critical Behavior:  Neurologic State: Alert  Orientation Level: Oriented X4  Cognition: Follows commands  Safety/Judgement: Awareness of environment  Functional Mobility Training:  Bed Mobility:  Rolling:  (not assessed; pt up at EOB on arrival)  Supine to Sit: Stand-by asssistance;Contact guard assistance  Sit to Supine:  (left in chair )  Transfers:  Sit to Stand: Supervision  Stand to Sit: Supervision  Balance:  Sitting: Intact  Sitting - Static: Good (unsupported)  Sitting - Dynamic: Good (unsupported)  Standing: Intact; Without support  Standing - Static: Good  Standing - Dynamic : Good  Ambulation/Gait Training:  Distance (ft): 140 Feet (ft)  Assistive Device: Gait belt  Ambulation - Level of Assistance: Supervision;Stand-by asssistance  Gait Abnormalities: Trunk sway increased  Base of Support: Widened  Speed/Zoila: Slow  Interventions: Safety awareness training  Pain:  Pre 2  Post 2  Pain Scale 1: Numeric (0 - 10)     Pain Location 1: Back  Pain Orientation 1: Posterior  Pain Description 1: Aching     Activity Tolerance:   Good  Please refer to the flowsheet for vital signs taken during this treatment.   After treatment:   [X] Patient left in no apparent distress sitting up in chair  [ ] Patient left in no apparent distress in bed  [X] Call bell left within reach  [ ] Nursing notified  [ ] Caregiver present  [ ] Bed alarm activated      [address], PTA   Time Calculation: 12 mins

## 2017-04-18 NOTE — DIABETES MGMT
NUTRITIONAL ASSESSMENT AND  PLAN OF CARE     Fabricio Suarez           78 y.o.           4/17/2017               No diagnosis found. INTERVENTIONS/PLAN:     1. Suggest increasing Levemir to 10 units every 12 hours. 2.  On-going diabetes education - see Diabetes Education Record for details. 3.  Monitor glycemic control, labs, po intake and weights. ASSESSMENT:   Nutritional Status:  Pt is 157% ideal wt; BMI (calculated): 36.0 kg/m2 (obese classification). Pt appears well nourished and po intake is adequate. Diabetes Management:   Pt is followed by Dr. Stephens Angelucci. Pt states he has been taking his metformin daily as directed. Pt related that at one time he was taking Trulicity but there was an insurance issue where he recently had it changed to [facility]. There was about 1 month where he was not taking either Trulicity or Bydureon. Pt has a meter and enough test strips to check his BS once/day. His usual fasting or before meal BG is about 150. Discussed strategy for paired testing (to determine post prandial blood glucose control) within pt's allotted test strips. Current blood glucose readings are elevated and pt may benefit from increased dose of Levemir. Recent blood glucose:   4/18/17:  249, 255  4/17/17:  156, 166, 168, 250, 328 - pt received total daily dose of insulin = 19 units insulin (14 units Levemir and 5 units corrective Humalog)    Within target range (non-ICU: <140; ICU<180): [] Yes   [x]  No    Current Insulin regimen:   Levemir 7 units every 12 hours  Corrective lispro, very insulin reistnt dosing, ACHS  Home medication/insulin regimen:   Metformin 500 mg BID  Bydrueon 2 mg every 7 days  HbA1c: 8.0% - ave BG has been ~ 180 mg/dL over past 3 months  Adequate glycemic control PTA:  [] Yes  [x] No       SUBJECTIVE/OBJECTIVE:   Information obtained from: chart review, pt  Pt reports his wt was stable PTA.   No known food allergies;  denies problems with chewing or swallowing. Pt reports good appetite. Pt is s/p L2 to L4 decompressive laminectomy for spinal stenosis 4/17/17;  PMHx incudes T2DM, HTN, CVA. Diet: consistent CHO - pt sitting up and heartily eating lunch meal.    Patient Vitals for the past 100 hrs:   % Diet Eaten   04/18/17 0945 100 %   04/17/17 1717 75 %         Medications: [x]                Reviewed     Most Recent POC Glucose: Recent Labs      04/18/17   0330   GLU  235*         Labs:   Lab Results   Component Value Date/Time    Hemoglobin A1c 8.0 04/18/2017 03:30 AM     Lab Results   Component Value Date/Time    Hemoglobin A1c 8.0 04/18/2017 03:30 AM    Hemoglobin A1c 7.8 04/17/2017 02:37 PM    Hemoglobin A1c 6.0 12/15/2009 11:18 AM     Chart Everywhere 5-13-16 A1C - 7.5%    Lab Results   Component Value Date/Time    Sodium 136 04/18/2017 03:30 AM    Potassium 5.0 04/18/2017 03:30 AM    Chloride 97 04/18/2017 03:30 AM    CO2 28 04/18/2017 03:30 AM    Anion gap 11 04/18/2017 03:30 AM    Glucose 235 04/18/2017 03:30 AM    BUN 35 04/18/2017 03:30 AM    Creatinine 1.87 04/18/2017 03:30 AM    Calcium 8.5 04/18/2017 03:30 AM    Albumin 3.6 04/18/2017 03:30 AM       Anthropometrics: IBW : 64.5 kg (142 lb 3.2 oz), % IBW (Calculated): 156.82 %, BMI (calculated): 36  Wt Readings from Last 1 Encounters:   04/17/17 101.2 kg (223 lb)    Ht Readings from Last 1 Encounters:   04/17/17 5' 6\" (1.676 m)       Estimated Nutrition Needs:  1580 Kcals/day, Protein (g): 77 g Fluid (ml): 1700 ml  Based on:   [x]          Actual BW    []          ABW   []            Adjusted BW        Nutrition Diagnoses: Altered nutrition related labs due to diabetes as evidenced by A1C of 8.0%^. Obesity due to excessive energy intake as evidenced by BMI of 36.0 kg/m2. Nutrition Interventions:  CHO consistent diet  Goal:   Blood glucose will be within target range of  mg/dL by 4/21/17. Weight maintenance (+/- 1-2 kg) or slow, gradual weight loss of 1-2# by 4/28/17. Nutrition Monitoring and Evaluation      [x]     Monitor po intake on meal rounds  [x]     Continue inpatient monitoring and intervention  []     Other:      Nutrition Risk:  []   High     []  Moderate    [x]  Minimal/Uncompromised    Dorothy Crump RD, CDE   Office:  [address] [place] [address] Pager:  [phone]

## 2017-04-19 VITALS
BODY MASS INDEX: 35.84 KG/M2 | SYSTOLIC BLOOD PRESSURE: 131 MMHG | WEIGHT: 223 LBS | HEART RATE: 74 BPM | OXYGEN SATURATION: 95 % | DIASTOLIC BLOOD PRESSURE: 80 MMHG | TEMPERATURE: 98.2 F | HEIGHT: 66 IN | RESPIRATION RATE: 18 BRPM

## 2017-04-19 LAB — GLUCOSE BLD STRIP.AUTO-MCNC: 156 MG/DL (ref 70–110)

## 2017-04-19 PROCEDURE — 74011250637 HC RX REV CODE- 250/637: Performed by: NEUROLOGICAL SURGERY

## 2017-04-19 PROCEDURE — 74011636637 HC RX REV CODE- 636/637: Performed by: HOSPITALIST

## 2017-04-19 PROCEDURE — 74011250637 HC RX REV CODE- 250/637: Performed by: HOSPITALIST

## 2017-04-19 PROCEDURE — 82962 GLUCOSE BLOOD TEST: CPT

## 2017-04-19 RX ORDER — OXYCODONE AND ACETAMINOPHEN 10; 325 MG/1; MG/1
1 TABLET ORAL
Qty: 15 TAB | Refills: 0 | Status: SHIPPED | OUTPATIENT
Start: 2017-04-19 | End: 2019-01-23

## 2017-04-19 RX ORDER — CYCLOBENZAPRINE HCL 10 MG
10 TABLET ORAL
Qty: 20 TAB | Refills: 0 | Status: SHIPPED | OUTPATIENT
Start: 2017-04-19 | End: 2019-01-23

## 2017-04-19 RX ADMIN — Medication 10 ML: at 05:53

## 2017-04-19 RX ADMIN — PSYLLIUM HUSK 1 PACKET: 3.4 POWDER ORAL at 09:00

## 2017-04-19 RX ADMIN — GABAPENTIN 1200 MG: 400 CAPSULE ORAL at 09:31

## 2017-04-19 RX ADMIN — VERAPAMIL HYDROCHLORIDE 180 MG: 180 TABLET, FILM COATED, EXTENDED RELEASE ORAL at 09:33

## 2017-04-19 RX ADMIN — INSULIN LISPRO 3 UNITS: 100 INJECTION, SOLUTION INTRAVENOUS; SUBCUTANEOUS at 09:30

## 2017-04-19 RX ADMIN — FENOFIBRATE 145 MG: 145 TABLET, FILM COATED ORAL at 09:32

## 2017-04-19 RX ADMIN — TELMISARTAN 80 MG: 40 TABLET ORAL at 09:31

## 2017-04-19 RX ADMIN — HYDROCHLOROTHIAZIDE 12.5 MG: 25 TABLET ORAL at 09:32

## 2017-04-19 RX ADMIN — DULOXETINE HYDROCHLORIDE 60 MG: 30 CAPSULE, DELAYED RELEASE ORAL at 09:31

## 2017-04-19 RX ADMIN — LORATADINE 10 MG: 10 TABLET ORAL at 09:00

## 2017-04-19 RX ADMIN — INSULIN DETEMIR 10 UNITS: 100 INJECTION, SOLUTION SUBCUTANEOUS at 09:30

## 2017-04-19 RX ADMIN — FAMOTIDINE 20 MG: 20 TABLET, FILM COATED ORAL at 09:34

## 2017-04-19 NOTE — ROUTINE PROCESS
Bedside and Verbal shift change report received from [facility]. Report included the following information SBAR, Kardex and MAR. Pt lying in bed, watching television, no c/o pain or discomfort at this time. Call bell and phone within reach. 0800- Pt voids 300 mL of manuelito urine. Pt O2 desats on tele, and pt started on 2 L NC. Pt O2 rises. Will continue to monitor. 1029 Pt ambulating in britt w/ PT.    1430- Pt and family given d/c instructions. Verbal understanding and electronic signature provided.

## 2017-04-19 NOTE — DISCHARGE SUMMARY
Discharge Summary    Patient: Pierce Barcenas               Sex: male          DOA: 4/17/2017         YOB: 1940      Age:  78 y.o.        LOS:  LOS: 1 day                Admit Date: 4/17/2017    Discharge Date: 4/19/2017    Admission Diagnoses: lumbar stenosis   m48.06  Lumbar stenosis  S/P lumbar laminectomy    Discharge Diagnoses:    Problem List as of 4/19/2017  Date Reviewed: 4/17/2017          Codes Class Noted - Resolved    S/P lumbar laminectomy ICD-10-CM: Z98.890  ICD-9-CM: V45.89  4/18/2017 - Present        * (Principal)Lumbar stenosis (Chronic) ICD-10-CM: M48.06  ICD-9-CM: 724.02  4/17/2017 - Present              Discharge Condition:  Improved    Hospital Course: pt is a 78year old male who was admitted to the hospital at [facility] he was admitted  by dr Courtney Crum. the pt has a h/o lumbar stenosis and   He underwent a lumbar 2-4 laminectomy . The  Pt has smoked  2-3 packs of cigarettes per day for 48 years . The pt has been having intermittent low back pain for several years . The pt has a h/o diabetes mellitus and is on insulin . He is obese and his hemoglobin a1c was 8,0 when he was admitted . The pt post op did very well and he was remarkably free of discomfort . He was seen by pt and ot and he walks with the aid of a walker . The pt remained afebrile and his blood glucose was under fair control . He will be given percocet for pain . He was on   scds for dvt prophylaxis .  The pt will go home today and will follow up with dr Norm Goodpasture:    Treatment Team: Attending Provider: Jack Garcia MD; Care Manager: Raj Kate; Utilization Review: Simona Brink; Care Manager: Jordon Giron RN; Occupational Therapy Assistant: Candis Wills OT; Physical Therapy Assistant: Effie Aschoff, PTA    Significant Diagnostic Studies:     Discharge Medications:     Current Discharge Medication List      START taking these medications    Details   cyclobenzaprine (FLEXERIL) 10 mg tablet Take 1 Tab by mouth three (3) times daily as needed for Muscle Spasm(s). Qty: 20 Tab, Refills: 0      oxyCODONE-acetaminophen (PERCOCET 10)  mg per tablet Take 1 Tab by mouth every four (4) hours as needed. Max Daily Amount: 6 Tabs. Qty: 15 Tab, Refills: 0         CONTINUE these medications which have NOT CHANGED    Details   fenofibrate micronized (LOFIBRA) 200 mg capsule Take 200 mg by mouth daily. testosterone (FORTESTA) 10 mg/0.5 gram /actuation glpm 4 Pump(s) by TransDERmal route daily. eszopiclone (LUNESTA) 2 mg tablet Take 2 mg by mouth nightly. atorvastatin (LIPITOR) 20 mg tablet Take 20 mg by mouth daily. telmisartan-hydroCHLOROthiazide (MICARDIS HCT) 80-12.5 mg per tablet Take 1 Tab by mouth daily. metFORMIN (GLUCOPHAGE) 500 mg tablet Take 500 mg by mouth two (2) times daily (with meals). loratadine (CLARITIN) 10 mg tablet Take 10 mg by mouth daily. verapamil ER (VERELAN) 180 mg CR capsule Take 180 mg by mouth daily. gabapentin (NEURONTIN) 600 mg tablet Take 1,200 mg by mouth three (3) times daily. DULoxetine (CYMBALTA) 60 mg capsule Take 60 mg by mouth daily. psyllium (METAMUCIL) powd Take  by mouth daily.          STOP taking these medications       clopidogrel (PLAVIX) 75 mg tab Comments:   Reason for Stopping:               Activity: as tolerated     Diet diabetic    Wound Care:  none    Follow-up: with neurosurgery

## 2017-04-19 NOTE — DISCHARGE INSTRUCTIONS
Lumbar Laminectomy for Spinal Stenosis: What to Expect at [address] can expect your back to feel stiff or sore after surgery. This should improve in the weeks after surgery. You may have trouble sitting or standing in one position for very long and may need pain medicine in the weeks after your surgery. Your doctor may advise you to work with a physical therapist to strengthen the muscles around your spine and trunk. You will need to learn how to lift, twist, and bend so that you do not put too much strain on your back. This care sheet gives you a general idea about how long it will take for you to recover. But each person recovers at a different pace. Follow the steps below to get better as quickly as possible. How can you care for yourself at home? Activity  · Rest when you feel tired. Getting enough sleep will help you recover. · Try to walk each day. Start by walking a little more than you did the day before. Bit by bit, increase the amount you walk. Walking boosts blood flow and helps prevent pneumonia and constipation. Walking may also decrease your muscle soreness after surgery. · If advised by your doctor, you may need to avoid lifting anything that would cause excessive strain on your back. This may include a child, heavy grocery bags and milk containers, a heavy briefcase or backpack, cat litter or dog food bags, or a vacuum . · Avoid strenuous activities, such as bicycle riding, jogging, weight lifting, or aerobic exercise, until your doctor says it is okay. · Do not drive for 2 to 4 weeks after your surgery or until your doctor says it is okay. · Avoid riding in a car for more than 30 minutes at a time for 2 to 4 weeks after surgery. If you must ride in a car for a longer distance, stop often to walk and stretch your legs. · Try to change your position about every 30 minutes while sitting or standing.  This will help decrease your back pain while you are healing. · You will probably need to take 4 to 6 weeks off from work. It depends on the type of work you do and how you feel. · You may have sex as soon as you feel able, but avoid positions that put stress on your back or cause pain. Diet  · You can eat your normal diet. If your stomach is upset, try bland, low-fat foods like plain rice, broiled chicken, toast, and yogurt. · Drink plenty of fluids (unless your doctor tells you not to). · You may notice that your bowel movements are not regular right after your surgery. This is common. Try to avoid constipation and straining with bowel movements. You may want to take a fiber supplement every day. If you have not had a bowel movement after a couple of days, ask your doctor about taking a mild laxative. Medicines  · Your doctor will tell you if and when you can restart your medicines. He or she will also give you instructions about taking any new medicines. · If you take blood thinners, such as warfarin (Coumadin), clopidogrel (Plavix), or aspirin, be sure to talk to your doctor. He or she will tell you if and when to start taking those medicines again. Make sure that you understand exactly what your doctor wants you to do. · Take pain medicines exactly as directed. ¨ If the doctor gave you a prescription medicine for pain, take it as prescribed. ¨ If you are not taking a prescription pain medicine, ask your doctor if you can take an over-the-counter medicine. · If your doctor prescribed antibiotics, take them as directed. Do not stop taking them just because you feel better. You need to take the full course of antibiotics. · If you think your pain medicine is making you sick to your stomach:  ¨ Take your medicine after meals (unless your doctor has told you not to). ¨ Ask your doctor for a different pain medicine.   Incision care  · If you have strips of tape on the cut (incision) the doctor made, leave the tape on for a week or until it falls off.  · Wash the area daily with warm, soapy water and pat it dry. · Keep the area clean and dry. You may cover it with a gauze bandage if it weeps or rubs against clothing. Change the bandage every day. Exercise  · Do back exercises as instructed by your doctor. · Your doctor may advise you to work with a physical therapist to improve the strength and flexibility of your back. Other instructions  · To reduce stiffness and help sore muscles, use a warm water bottle, a heating pad set on low, or a warm cloth on your back. Do not put heat right over the incision. Do not go to sleep with a heating pad on your skin. Follow-up care is a key part of your treatment and safety. Be sure to make and go to all appointments, and call your doctor if you are having problems. It's also a good idea to know your test results and keep a list of the medicines you take. When should you call for help? Call 911 anytime you think you may need emergency care. For example, call if:  · You passed out (lost consciousness). · You have sudden chest pain and shortness of breath, or you cough up blood. · You are unable to move a leg at all. Call your doctor now or seek immediate medical care if:  · You have new or worse symptoms in your legs or buttocks. Symptoms may include:  ¨ Numbness or tingling. ¨ Weakness. ¨ Pain. · You lose bladder or bowel control. · You have loose stitches, or your incision comes open. · You have blood or fluid draining from the incision. · You have signs of infection, such as:  ¨ Increased pain, swelling, warmth, or redness. ¨ Pus draining from the incision. ¨ A fever. ¨ Red streaks leading from the incision. Watch closely for changes in your health, and be sure to contact your doctor if:  · You do not have a bowel movement after taking a laxative. · You are not getting better as expected. Where can you learn more? Go to http://trini-elvia.info/.   Enter O133 in the search box to learn more about \"Lumbar Laminectomy for Spinal Stenosis: What to Expect at Home. \"  Current as of: 2016  Content Version: 11.2  © 1390-9516 [employer]. Care instructions adapted under license by [employer] (which disclaims liability or warranty for this information). If you have questions about a medical condition or this instruction, always ask your healthcare professional. [address] any warranty or liability for your use of this information. Patient armband removed and shredded    MyChart Activation    Thank you for requesting access to [employer]. Please follow the instructions below to securely access and download your online medical record. [employer] allows you to send messages to your doctor, view your test results, renew your prescriptions, schedule appointments, and more. How Do I Sign Up? 1. In your internet browser, go to [email or web address].-[email or web address]  2. Click on the First Time User? Click Here link in the Sign In box. You will be redirect to the New Member Sign Up page. 3. Enter your [employer] Access Code exactly as it appears below. You will not need to use this code after youve completed the sign-up process. If you do not sign up before the expiration date, you must request a new code. [employer] Access Code: [email or web address]  Expires: 2017 11:25 AM (This is the date your [employer] access code will )    4. Enter the last four digits of your Social Security Number (xxxx) and Date of Birth (mm/dd/yyyy) as indicated and click Submit. You will be taken to the next sign-up page. 5. Create a [employer] ID. This will be your [employer] login ID and cannot be changed, so think of one that is secure and easy to remember. 6. Create a [employer] password. You can change your password at any time. 7. Enter your Password Reset Question and Answer. This can be used at a later time if you forget your password. 8. Enter your e-mail address.  You will receive e-mail notification when new information is available in [address] 9. Click Sign Up. You can now view and download portions of your medical record. 10. Click the Download Summary menu link to download a portable copy of your medical information. Additional Information    If you have questions, please visit the Frequently Asked Questions section of the [employer] website at [email or web address] Remember, [employer] is NOT to be used for urgent needs. For medical emergencies, dial 911. DISCHARGE SUMMARY from Nurse    The following personal items are in your possession at time of discharge:    Dental Appliances: None  Visual Aid: Glasses, With patient  Hearing Aids/Status: With patient  Home Medications: None  Jewelry: None  Clothing: None  Other Valuables: None  Personal Items Sent to Safe: none          PATIENT INSTRUCTIONS:    After general anesthesia or intravenous sedation, for 24 hours or while taking prescription Narcotics:  · Limit your activities  · Do not drive and operate hazardous machinery  · Do not make important personal or business decisions  · Do  not drink alcoholic beverages  · If you have not urinated within 8 hours after discharge, please contact your surgeon on call. Report the following to your surgeon:  · Excessive pain, swelling, redness or odor of or around the surgical area  · Temperature over 100.5  · Nausea and vomiting lasting longer than 4 hours or if unable to take medications  · Any signs of decreased circulation or nerve impairment to extremity: change in color, persistent  numbness, tingling, coldness or increase pain  · Any questions        What to do at Home:  Recommended activity: Activity as tolerated and no driving for today    If you experience any of the following symptoms chest pain, shortness of breath, or new or worsening pain, please follow up with your primary care provider.       *  Please give a list of your current medications to your Primary Care Provider. *  Please update this list whenever your medications are discontinued, doses are      changed, or new medications (including over-the-counter products) are added. *  Please carry medication information at all times in case of emergency situations. These are general instructions for a healthy lifestyle:    No smoking/ No tobacco products/ Avoid exposure to second hand smoke    Surgeon General's Warning:  Quitting smoking now greatly reduces serious risk to your health. Obesity, smoking, and sedentary lifestyle greatly increases your risk for illness    A healthy diet, regular physical exercise & weight monitoring are important for maintaining a healthy lifestyle    You may be retaining fluid if you have a history of heart failure or if you experience any of the following symptoms:  Weight gain of 3 pounds or more overnight or 5 pounds in a week, increased swelling in our hands or feet or shortness of breath while lying flat in bed. Please call your doctor as soon as you notice any of these symptoms; do not wait until your next office visit. Recognize signs and symptoms of STROKE:    F-face looks uneven    A-arms unable to move or move unevenly    S-speech slurred or non-existent    T-time-call 911 as soon as signs and symptoms begin-DO NOT go       Back to bed or wait to see if you get better-TIME IS BRAIN. Warning Signs of HEART ATTACK     Call 911 if you have these symptoms:   Chest discomfort. Most heart attacks involve discomfort in the center of the chest that lasts more than a few minutes, or that goes away and comes back. It can feel like uncomfortable pressure, squeezing, fullness, or pain.  Discomfort in other areas of the upper body. Symptoms can include pain or discomfort in one or both arms, the back, neck, jaw, or stomach.  Shortness of breath with or without chest discomfort.    Other signs may include breaking out in a cold sweat, nausea, or lightheadedness. Don't wait more than five minutes to call 911 - MINUTES MATTER! Fast action can save your life. Calling 911 is almost always the fastest way to get lifesaving treatment. Emergency Medical Services staff can begin treatment when they arrive -- up to an hour sooner than if someone gets to the hospital by car. The discharge information has been reviewed with the patient and spouse. The patient and spouse verbalized understanding. Discharge medications reviewed with the patient and spouse and appropriate educational materials and side effects teaching were provided.

## 2019-01-28 PROBLEM — M48.062 LUMBAR STENOSIS WITH NEUROGENIC CLAUDICATION: Status: ACTIVE | Noted: 2019-01-28

## 2024-12-31 NOTE — DIABETES MGMT
INSTRUCCIONES PARA LA COLONOSCOPIA/EGD: GOLYTELY PREP     Paciente: Jose Grider   [address] [facility] [address]    Medico: Yue Washington MD    Jha colon debe de estar limpio de excremento para obtener un examen (vista) exitoso. Debe seguir estas instrucciones para tener kelly colonoscopia exitosa.    Jha examen esta programado para:    Día: JUEVES/Thursday    Fecha:  Febrero/February13 2025    Hora de llegar:   (Recibirá un mensaje de confirmación 3 días antes de jha sergey, si no recibe un mensaje o tiene preguntas,  comuníquese al [phone] o visite el portal del paciente para obtener más detalles). Tenga en cuenta que el tiempo de jha procedimiento está sujeto a cambios).     Recibirá sedación para jha procedimiento y DEBE tener un adulto mayor de 18 años para llevarlo a casa y estar con usted después del procedimiento.     Lugar: [facility], [address] [place] [address] - Instrucciones: Llegue hasta el vestíbulo principal del edificio y tome el ascensor interior hasta el nivel inferior. Gire a la izquierda fuera del ascensor y camine recto hasta el área de recepción de endoscopia.    Usted necesitara lo siguiente para completar jha preparación:  Golytely/Nulytely  Dos tabletas de Simethicone (Puede comprar sin receta)  Dos tabletas de Dulcolax (Bisacodyl) (Puede comprar sin receta)    Jha kit de preparación ha sido enviado a   [facility] [address] [facility] [address] [facility] [address]  [address] [address]  Phone: [phone] Fax: [phone]  Por favor, recoja el kit hoy. Si no se recoge dentro de los 7 días, comuníquese directamente con jha farmacia, ya que la receta se volvería a colocar y se volvería a almacenar.                            Si usted es diabético:  n/a    7 días antes : Repasar las instrucciones. (Las instrucciones también se pueden encontrar en MyChart en la pestaña de letras en comunicación)  • Deje de comer palomitas de maíz, nueces,  Diabetes Patient/Family Education Record Plan of Care    Factors That  May Influence Patients Ability  to Learn or  Comply With  Recommendations:    []   Language barrier    []   Cultural needs   []   Motivation    []   Cognitive limitation    []   Physical   []   Education    []   Physiological factors   []   Hearing/vision/speaking impairment   []   Muslim beliefs    []   Financial factors   [x]  Other:  A lot of interruptions from staff   []  No factors identified at this time.      Person Instructed:   [x]   Patient   []   Family   []  Other     Preference for Learning:   [x]   Verbal   [x]   Written   []  Demonstration     Level of Comprehension & Competence:    []  Good                                      [x] Fair                                     []  Poor                             []  Needs Reinforcement   [x]  Teachback completed    Education Component:   [x]  Medication management, including how to administer insulin (if appropriate) and potential medication interactions    [x]  Nutritional management including the role of carbohydrate intake   []  Exercise   []  Signs, symptoms, and treatment of hyperglycemia and hypoglycemia   [] Treatment of hyperglycemia and hypoglycemia   [x]  Importance of blood glucose monitoring and how to obtain a blood glucose meter    []  Instruction on use of blood glucose meter   [x]  Discuss the importance of HbA1C monitoring and provide patient with  results   []  Sick day guidelines   []  Proper use and disposal of lancets, needles, syringes or insulin pens (if appropriate)   []  Potential long-term complications (retinopathy, kidney disease, neuropathy, heart disease, stroke, vascular disease, foot care)   [x] Provide emergency contact number and contact number for more information    []  Goal:  Patient/family will demonstrate understanding of Diabetes Self Management Skills by: (date) __4/18/17_____  Plan for post-discharge education or self-management support: [x] Outpatient class schedule provided            [] Patient Declined    [] Scheduled for outpatient classes (date) _______     Mya Jain RD, CDE   Office:  [address] [place] [address] Pager:  [phone] semillas de emperatriz / sésamo o cualquier alimento que contenga semillas    3 días antes: Deje de norberto todos los medicamentos antiinflamatorios. Estos incluyen Advil, Aleve, Naprosyn, (Tylenol está colin para norberto)    1 DÍA ANTES del procedimiento:   •Comenzar kelly dieta de líquidos yo muy rígida  desde el momento en que despierte. Kelly dieta de líquidos yo puede incluir: Jugos de manzana, uva prema, o arándano; caldo de res o de diana que shayne yo - nada de pasta, vegetales, arroz, etc.; té o café sin leche; refresco, Gatorade, Dionicio-Aid y gelatina de varios sabores (de cualquier color menos stoll o lalito).   •Evitar: jugos con pulpa, leche, crema, alimentos sólidos, bebidas alcohólicas, chicle y dulces.    En la mañana al despertar, sigua las instrucciones del envase para mezclar jha preparativo.    4:00 p.m.: Beber 8 onzas de solución cada 10 minutos hasta terminar la mitad de la solucion. Masticar kelly tableta de Simethicone (GasX).      Continúe bebiendo líquidos yo jose toda la noche, river no coma ningún alimento sólido.  6 horas antes de la hora de llegada: Shreya 8 onzas de solución cada 10 minutos hasta que esté completa.  Fuig kelly tableta de Dulcolax (Bisacodyl) con cada abbe de los dos últimos vasos de la solución. Mastique la ultima tableta de simeticona. Para kelly preparación exitosa, debe beber toda la solución.  • Fuig argenis medicamentos; none 4 horas antes de jha hora de llegada.   DESPUES NO BEBER LIQUIDOS YO, INCLUYENDO  AGUA   -Quitar TODAS las joyas y Perforaciones corporaels (anillos, collares, todos perforaciones corporaels, etc.) antes de llegar a jha procedimiento.     Si todavía tiene heces sólidas / formadas o problemas para completar esta preparación, llame al consultorio del médico al [phone].

## (undated) DEVICE — THE CANADY HYBRID PLASMA SCALPEL IS AN ELECTROSURGICAL PLASMA SCALPEL THAT USES AN 85MM BENDABLE PADDLE BLADE TIP. THE ELECTROSURGICAL PLASMA SCALPEL IS USED TO SIMULTANEOUSLY CUT AND COAGULATE BIOLOGICAL TISSUE.: Brand: CANADY HYBRID PLASMA PADDLE BLADE

## (undated) DEVICE — SUTURE VCRL SZ 2-0 L18IN ABSRB UD CT-1 L36MM 1/2 CIR J839D

## (undated) DEVICE — Z CONVERTED USE 2276507 CONNECTOR TBNG POLYPR 5IN1 TOUGH SHATTERPROOF CLN FOR

## (undated) DEVICE — PREP SKN DURAPREP 26ML APPL --

## (undated) DEVICE — INTENDED FOR TISSUE SEPARATION, AND OTHER PROCEDURES THAT REQUIRE A SHARP SURGICAL BLADE TO PUNCTURE OR CUT.: Brand: BARD-PARKER ® CARBON RIB-BACK BLADES

## (undated) DEVICE — NEEDLE HYPO 22GA L1.5IN BLK POLYPR HUB S STL REG BVL STR

## (undated) DEVICE — SPONGE GZ W4XL4IN COT 12 PLY TYP VII WVN C FLD DSGN

## (undated) DEVICE — CODMAN® SURGICAL PATTIES 1/2" X 3" (1.27CM X 7.62CM): Brand: CODMAN®

## (undated) DEVICE — SUTURE VCRL SZ 0 L36IN ABSRB UD L36MM CT-1 1/2 CIR J946H

## (undated) DEVICE — KIT JACK TBL PT CARE

## (undated) DEVICE — SNAP KOVER: Brand: UNBRANDED

## (undated) DEVICE — 3M™ STERI-STRIP™ REINFORCED ADHESIVE SKIN CLOSURES, R1548, 1 IN X 5 IN (25 MM X 125 MM), 4 STRIPS/ENVELOPE: Brand: 3M™ STERI-STRIP™

## (undated) DEVICE — SUTURE VCRL SZ 3-0 L27IN ABSRB UD L19MM PS-2 3/8 CIR PRIM J427H

## (undated) DEVICE — SYRINGE MED 3ML NDL 22GA L1.5IN PLAS N CTRL LUERLOCK TIP

## (undated) DEVICE — DRAPE SHEET, X-LARGE: Brand: CONVERTORS

## (undated) DEVICE — FLEX ADVANTAGE 1500CC: Brand: FLEX ADVANTAGE

## (undated) DEVICE — CATHETER DRNGE 32FR 4 WNG DISP FOR NEPHSTMY MALECOTS

## (undated) DEVICE — ASTOUND STANDARD SURGICAL GOWN, XXL: Brand: CONVERTORS

## (undated) DEVICE — CATHETER IV 10GA L3IN OD3.3-3.5MM ID2.64-2.74MM BRN FEP

## (undated) DEVICE — INCISE SURGICAL DRAPE: Brand: OPSITE INCISE 14X25CM CTN 20

## (undated) DEVICE — STERILE LATEX POWDER-FREE SURGICAL GLOVESWITH NITRILE COATING: Brand: PROTEXIS

## (undated) DEVICE — STERILE POLYISOPRENE POWDER-FREE SURGICAL GLOVES: Brand: PROTEXIS

## (undated) DEVICE — SKIN MARKER,REGULAR TIP WITH RULER AND LABELS: Brand: DEVON

## (undated) DEVICE — AGENT HEMSTAT 8ML FLX TIP MTRX + DISP SURGIFLO

## (undated) DEVICE — FLOSEAL MATRIX IS INDICATED IN SURGICAL PROCEDURES (OTHER THAN IN OPHTHALMIC) AS AN ADJUNCT TO HEMOSTASIS WHEN CONTROL OF BLEEDING BY LIGATURE OR CONVENTIONALPROCEDURES IS INEFFECTIVE OR IMPRACTICAL.: Brand: FLOSEAL HEMOSTATIC MATRIX

## (undated) DEVICE — TELFA NON-ADHERENT PADS PREPAK: Brand: TELFA

## (undated) DEVICE — CATHETER IV 14GA L1.25IN ORNG POLY SFTY SYS FULL ENCASED

## (undated) DEVICE — TOOL 14MH30 LEGEND 14CM 3MM: Brand: MIDAS REX ™

## (undated) DEVICE — SPINE PACK DEPAUL: Brand: MEDLINE INDUSTRIES, INC.

## (undated) DEVICE — SYRINGE MED 20ML STD CLR PLAS LUERLOCK TIP N CTRL DISP

## (undated) DEVICE — SOLUTION IV 1000ML 0.9% SOD CHL

## (undated) DEVICE — SUTURE VCRL SZ 0 L18IN ABSRB UD L36MM CT-1 1/2 CIR J840D

## (undated) DEVICE — REGULATOR S P NIST COND STD 1000

## (undated) DEVICE — KIT CATH OD16FR 5ML BLLN SIL URIN INDWL STR TIP INF CTRL

## (undated) DEVICE — KENDALL SCD EXPRESS SLEEVES, KNEE LENGTH, MEDIUM: Brand: KENDALL SCD

## (undated) DEVICE — PACKING 8004000 NEURAY 200PK 13X13MM: Brand: NEURAY ®

## (undated) DEVICE — (D)SYR 10ML 1/5ML GRAD NSAF -- PKGING CHANGE USE ITEM 338027